# Patient Record
Sex: FEMALE | Race: BLACK OR AFRICAN AMERICAN | Employment: FULL TIME | URBAN - METROPOLITAN AREA
[De-identification: names, ages, dates, MRNs, and addresses within clinical notes are randomized per-mention and may not be internally consistent; named-entity substitution may affect disease eponyms.]

---

## 2020-10-25 ENCOUNTER — HOSPITAL ENCOUNTER (EMERGENCY)
Facility: CLINIC | Age: 45
Discharge: HOME | End: 2020-10-25
Attending: EMERGENCY MEDICINE | Admitting: EMERGENCY MEDICINE
Payer: COMMERCIAL

## 2020-10-25 ENCOUNTER — APPOINTMENT (OUTPATIENT)
Dept: GENERAL RADIOLOGY | Facility: CLINIC | Age: 45
End: 2020-10-25
Attending: EMERGENCY MEDICINE
Payer: COMMERCIAL

## 2020-10-25 VITALS
WEIGHT: 190 LBS | RESPIRATION RATE: 16 BRPM | TEMPERATURE: 98 F | SYSTOLIC BLOOD PRESSURE: 114 MMHG | BODY MASS INDEX: 28.79 KG/M2 | HEIGHT: 68 IN | HEART RATE: 79 BPM | OXYGEN SATURATION: 99 % | DIASTOLIC BLOOD PRESSURE: 78 MMHG

## 2020-10-25 DIAGNOSIS — R05.9 COUGH: ICD-10-CM

## 2020-10-25 DIAGNOSIS — U07.1 INFECTION DUE TO 2019 NOVEL CORONAVIRUS: ICD-10-CM

## 2020-10-25 LAB
ALBUMIN SERPL-MCNC: 3.2 G/DL (ref 3.4–5)
ALP SERPL-CCNC: 73 U/L (ref 40–150)
ALT SERPL W P-5'-P-CCNC: 31 U/L (ref 0–50)
ANION GAP SERPL CALCULATED.3IONS-SCNC: 4 MMOL/L (ref 3–14)
AST SERPL W P-5'-P-CCNC: 46 U/L (ref 0–45)
BASOPHILS # BLD AUTO: 0 10E9/L (ref 0–0.2)
BASOPHILS NFR BLD AUTO: 0.3 %
BILIRUB SERPL-MCNC: 0.2 MG/DL (ref 0.2–1.3)
BUN SERPL-MCNC: 8 MG/DL (ref 7–30)
CALCIUM SERPL-MCNC: 8.3 MG/DL (ref 8.5–10.1)
CHLORIDE SERPL-SCNC: 104 MMOL/L (ref 94–109)
CO2 SERPL-SCNC: 29 MMOL/L (ref 20–32)
CREAT SERPL-MCNC: 0.61 MG/DL (ref 0.52–1.04)
DIFFERENTIAL METHOD BLD: ABNORMAL
EOSINOPHIL # BLD AUTO: 0 10E9/L (ref 0–0.7)
EOSINOPHIL NFR BLD AUTO: 0.3 %
ERYTHROCYTE [DISTWIDTH] IN BLOOD BY AUTOMATED COUNT: 13.2 % (ref 10–15)
GFR SERPL CREATININE-BSD FRML MDRD: >90 ML/MIN/{1.73_M2}
GLUCOSE SERPL-MCNC: 98 MG/DL (ref 70–99)
HCT VFR BLD AUTO: 42.1 % (ref 35–47)
HGB BLD-MCNC: 13.8 G/DL (ref 11.7–15.7)
IMM GRANULOCYTES # BLD: 0 10E9/L (ref 0–0.4)
IMM GRANULOCYTES NFR BLD: 0 %
INTERPRETATION ECG - MUSE: NORMAL
LYMPHOCYTES # BLD AUTO: 1.6 10E9/L (ref 0.8–5.3)
LYMPHOCYTES NFR BLD AUTO: 48.6 %
MCH RBC QN AUTO: 30.1 PG (ref 26.5–33)
MCHC RBC AUTO-ENTMCNC: 32.8 G/DL (ref 31.5–36.5)
MCV RBC AUTO: 92 FL (ref 78–100)
MONOCYTES # BLD AUTO: 0.3 10E9/L (ref 0–1.3)
MONOCYTES NFR BLD AUTO: 8 %
NEUTROPHILS # BLD AUTO: 1.4 10E9/L (ref 1.6–8.3)
NEUTROPHILS NFR BLD AUTO: 42.8 %
NRBC # BLD AUTO: 0 10*3/UL
NRBC BLD AUTO-RTO: 0 /100
PLATELET # BLD AUTO: 134 10E9/L (ref 150–450)
POTASSIUM SERPL-SCNC: 3.6 MMOL/L (ref 3.4–5.3)
PROT SERPL-MCNC: 7.5 G/DL (ref 6.8–8.8)
RBC # BLD AUTO: 4.58 10E12/L (ref 3.8–5.2)
SODIUM SERPL-SCNC: 137 MMOL/L (ref 133–144)
TROPONIN I SERPL-MCNC: <0.015 UG/L (ref 0–0.04)
WBC # BLD AUTO: 3.3 10E9/L (ref 4–11)

## 2020-10-25 PROCEDURE — 93005 ELECTROCARDIOGRAM TRACING: CPT

## 2020-10-25 PROCEDURE — 99285 EMERGENCY DEPT VISIT HI MDM: CPT | Mod: 25

## 2020-10-25 PROCEDURE — 80053 COMPREHEN METABOLIC PANEL: CPT | Performed by: EMERGENCY MEDICINE

## 2020-10-25 PROCEDURE — 85025 COMPLETE CBC W/AUTO DIFF WBC: CPT | Performed by: EMERGENCY MEDICINE

## 2020-10-25 PROCEDURE — 84484 ASSAY OF TROPONIN QUANT: CPT | Performed by: EMERGENCY MEDICINE

## 2020-10-25 PROCEDURE — 71045 X-RAY EXAM CHEST 1 VIEW: CPT

## 2020-10-25 ASSESSMENT — ENCOUNTER SYMPTOMS
SHORTNESS OF BREATH: 1
COUGH: 1
CHEST TIGHTNESS: 1

## 2020-10-25 ASSESSMENT — MIFFLIN-ST. JEOR: SCORE: 1570.33

## 2020-10-25 NOTE — ED PROVIDER NOTES
"  History     Chief Complaint:  Suspected Covid and Cough    HPI   Anabel Moon is a 44 year old female who presents with cough and chest tightness/pressure present for a few days. She endorses slight shortness of breath and cough. Has a tightness on her chest. She denies any sharp pain, leg pain, or leg edema.The patient tested positive for Covid 10/18. She has been managing with OTC medications. No hx of DVT/PE. No hx of recent long travel or surgeries.    Allergies:  NKDA     Medications:    The patient is not currently taking any prescribed medications.    Past Medical History:    Covid-19    Past Surgical History:    The patient does not have any pertinent past surgical history.     Family History:    History reviewed. No pertinent family history.     Social History:  Unknown.    Review of Systems   Respiratory: Positive for cough, chest tightness and shortness of breath.    Cardiovascular: Negative for chest pain and leg swelling.   10 point review of systems was obtained and negative other than mentioned above.    Physical Exam     Patient Vitals for the past 24 hrs:   BP Temp Temp src Pulse Resp SpO2 Height Weight   10/25/20 1027 114/78 98  F (36.7  C) Oral 79 16 99 % 1.727 m (5' 8\") 86.2 kg (190 lb)       Physical Exam    General: Sitting up in bed  Eyes:  The pupils are equal and round    Conjunctivae and sclerae are normal  ENT:    Wearing a mask  Neck:  Normal range of motion  CV:  Regular rate, regular rhythm     Skin warm and well perfused   Resp:  Non labored breathing on room air    No tachypnea    No cough heard    Lungs clear bilaterally  GI:  Abdomen is soft, there is no rigidity    No distension    No rebound tenderness     No abdominal tenderness  MS:  No leg swelling  Skin:  No rash or acute skin lesions noted  Neuro:   Awake, alert.      Speech is normal and fluent.    Face is symmetric.     Moves all extremities equally  Psych: Normal affect.  Appropriate interactions.    Emergency Department " Course   ECG (10:58:50):  Rate 75 bpm. PA interval 148. QRS duration 90. QT/QTc 378/422. P-R-T axes 61 63 9. Normal sinus rhythm. Nonspecific T wave abnormality. Abnormal ECG. Interpreted at 1100 by Laverne Fong MD.    Imaging:  Radiology findings were communicated with the patient who voiced understanding of the findings.    XR Chest Port 1 View  IMPRESSION: No acute disease.  As read by Radiology.    Laboratory:  Laboratory findings were communicated with the patient who voiced understanding of the findings.    CMP: Calcium 8.3(L), Albumin 3.2(L), AST 46(H), o/w WNL (Creatinine 0.61)    CBC: WBC 3.3(L), HGB 13.8, (L)    Troponin I 1111: <0.015     Emergency Department Course:  Past medical records, nursing notes, and vitals reviewed.  1028: I performed an exam of the patient and obtained history, as documented above.     EKG was obtained and reviewed in the emergency department.    IV inserted and blood drawn.    The patient was sent for a XR while in the emergency department, results above.     1209: I rechecked the patient. I reviewed the results with the Patient and answered all related questions prior to discharge.     Findings and plan explained to the Patient. Patient discharged home with instructions regarding supportive care, medications, and reasons to return. The importance of close follow-up was reviewed.   Impression & Plan     Medical Decision Making:  Anabel Moon is a 44 year old female who presents for evaluation of cough and chest tightness. Has known covid. Given that the patient is otherwise hemodynamically stable without significant hypoxia, I do not believe that the patient requires admission here today. They are at risk for pneumonia but no signs of this are detected on today's visit. Doubt PE with no hypoxia, no tachycardia, no pleuritic pain. No evidence of myocarditis and doubt ACS.  There is no signs of serious bacterial infection such as bacteremia, meningitis,  UTI/pyelonephritis, strep pharyngitis, etc. Thinks she is on day 9-10 days of symptoms and should hopefully start improving soon.    Covid-19  Anabel Moon was evaluated during a global COVID-19 pandemic, which necessitated consideration that the patient might be at risk for infection with the SARS-CoV-2 virus that causes COVID-19.   Applicable protocols for evaluation were followed during the patient's care.   COVID-19 was considered as part of the patient's evaluation. The plan for testing is:  a positive test was obtained seven days ago and reviewed & considered today.    Diagnosis:    ICD-10-CM   1. Cough  R05   2. Infection due to 2019 novel coronavirus  U07.1       Disposition:  Discharged to home.    Mahendra Washington  10/25/2020   [facility] EMERGENCY DEPT    Scribe Disclosure:  Mahendra RAZA, am serving as a scribe at 10:24 AM on 10/25/2020 to document services personally performed by Laverne Fong MD based on my observations and the provider's statements to me.          Laverne Fong MD  10/25/20 5140

## 2020-10-25 NOTE — ED AVS SNAPSHOT
[facility] Emergency Dept  [address] [address]  Phone: [phone]  Fax: [phone]                                    Anabel Moon   MRN: [id number]    Department: [facility] Emergency Dept   Date of Visit: 10/25/2020           After Visit Summary Signature Page    I have received my discharge instructions, and my questions have been answered. I have discussed any challenges I see with this plan with the nurse or doctor.    ..........................................................................................................................................  Patient/Patient Representative Signature      ..........................................................................................................................................  Patient Representative Print Name and Relationship to Patient    ..................................................               ................................................  Date                                   Time    ..........................................................................................................................................  Reviewed by Signature/Title    ...................................................              ..............................................  Date                                               Time          22EPIC Rev 08/18

## 2021-01-07 ENCOUNTER — OFFICE VISIT (OUTPATIENT)
Dept: URGENT CARE | Facility: URGENT CARE | Age: 46
End: 2021-01-07
Payer: COMMERCIAL

## 2021-01-07 VITALS
HEART RATE: 59 BPM | SYSTOLIC BLOOD PRESSURE: 128 MMHG | HEIGHT: 67 IN | BODY MASS INDEX: 29.82 KG/M2 | TEMPERATURE: 98 F | DIASTOLIC BLOOD PRESSURE: 79 MMHG | OXYGEN SATURATION: 100 % | WEIGHT: 190 LBS | RESPIRATION RATE: 19 BRPM

## 2021-01-07 DIAGNOSIS — K21.9 GASTROESOPHAGEAL REFLUX DISEASE WITHOUT ESOPHAGITIS: Primary | ICD-10-CM

## 2021-01-07 PROCEDURE — 99204 OFFICE O/P NEW MOD 45 MIN: CPT | Performed by: PHYSICIAN ASSISTANT

## 2021-01-07 ASSESSMENT — MIFFLIN-ST. JEOR: SCORE: 1549.46

## 2021-01-07 NOTE — PROGRESS NOTES
"  1. Gastroesophageal reflux disease without esophagitis    - omeprazole (PRILOSEC) 20 MG DR capsule; Take 1 capsule (20 mg) by mouth daily  Dispense: 30 capsule; Refill: 1      She was educated on lifestyle and food changes to help decrease her symptoms. Trial of omeprazole as well. She will follow up in a  clinic to also establish care.     Subjective     Anabel Moon is a 45 year old who presents to clinic today for the following health issues     HPI     2 days ago in [place] with blood work for her burning sensation to stomach. CBC normal. BMP normal.  No blood or blackness in stool. No medication given. Worsening symptoms with acid reflux and burning sensation. No nausea or vomiting.  Does eat food that can be irritating including spicy foods.               Review of Systems         Objective    /79   Pulse 59   Temp 98  F (36.7  C) (Temporal)   Resp 19   Ht 1.702 m (5' 7\")   Wt 86.2 kg (190 lb)   SpO2 100%   BMI 29.76 kg/m    Body mass index is 29.76 kg/m .  Physical Exam  Constitutional:       Appearance: Normal appearance.   Abdominal:      General: Abdomen is flat. Bowel sounds are normal.      Tenderness: There is abdominal tenderness in the epigastric area. There is no guarding or rebound.       Neurological:      Mental Status: She is alert.                        "

## 2021-01-08 ENCOUNTER — OFFICE VISIT (OUTPATIENT)
Dept: FAMILY MEDICINE | Facility: CLINIC | Age: 46
End: 2021-01-08
Payer: COMMERCIAL

## 2021-01-08 ENCOUNTER — HOSPITAL ENCOUNTER (OUTPATIENT)
Dept: CT IMAGING | Facility: CLINIC | Age: 46
Discharge: HOME | End: 2021-01-08
Attending: NURSE PRACTITIONER | Admitting: NURSE PRACTITIONER
Payer: COMMERCIAL

## 2021-01-08 VITALS
WEIGHT: 185 LBS | BODY MASS INDEX: 29.03 KG/M2 | HEIGHT: 67 IN | DIASTOLIC BLOOD PRESSURE: 71 MMHG | OXYGEN SATURATION: 100 % | HEART RATE: 71 BPM | TEMPERATURE: 97.5 F | SYSTOLIC BLOOD PRESSURE: 108 MMHG

## 2021-01-08 DIAGNOSIS — Z86.16 PERSONAL HISTORY OF COVID-19: ICD-10-CM

## 2021-01-08 DIAGNOSIS — F40.9 FEAR: ICD-10-CM

## 2021-01-08 DIAGNOSIS — R06.02 SOB (SHORTNESS OF BREATH): ICD-10-CM

## 2021-01-08 DIAGNOSIS — F41.9 ANXIETY: ICD-10-CM

## 2021-01-08 DIAGNOSIS — R00.2 POUNDING HEARTBEAT: Primary | ICD-10-CM

## 2021-01-08 DIAGNOSIS — R00.2 POUNDING HEARTBEAT: ICD-10-CM

## 2021-01-08 DIAGNOSIS — R30.0 DYSURIA: ICD-10-CM

## 2021-01-08 LAB
ALBUMIN UR-MCNC: NEGATIVE MG/DL
ANION GAP SERPL CALCULATED.3IONS-SCNC: 4 MMOL/L (ref 3–14)
APPEARANCE UR: CLEAR
BILIRUB UR QL STRIP: NEGATIVE
BUN SERPL-MCNC: 6 MG/DL (ref 7–30)
CALCIUM SERPL-MCNC: 8.9 MG/DL (ref 8.5–10.1)
CHLORIDE SERPL-SCNC: 106 MMOL/L (ref 94–109)
CO2 SERPL-SCNC: 29 MMOL/L (ref 20–32)
COLOR UR AUTO: YELLOW
CREAT SERPL-MCNC: 0.63 MG/DL (ref 0.52–1.04)
ERYTHROCYTE [DISTWIDTH] IN BLOOD BY AUTOMATED COUNT: 14 % (ref 10–15)
GFR SERPL CREATININE-BSD FRML MDRD: >90 ML/MIN/{1.73_M2}
GLUCOSE SERPL-MCNC: 94 MG/DL (ref 70–99)
GLUCOSE UR STRIP-MCNC: NEGATIVE MG/DL
HCT VFR BLD AUTO: 39.3 % (ref 35–47)
HGB BLD-MCNC: 12.7 G/DL (ref 11.7–15.7)
HGB UR QL STRIP: NEGATIVE
KETONES UR STRIP-MCNC: NEGATIVE MG/DL
LEUKOCYTE ESTERASE UR QL STRIP: NEGATIVE
MCH RBC QN AUTO: 30.2 PG (ref 26.5–33)
MCHC RBC AUTO-ENTMCNC: 32.3 G/DL (ref 31.5–36.5)
MCV RBC AUTO: 93 FL (ref 78–100)
NITRATE UR QL: NEGATIVE
PH UR STRIP: 7 PH (ref 5–7)
PLATELET # BLD AUTO: 250 10E9/L (ref 150–450)
POTASSIUM SERPL-SCNC: 4.2 MMOL/L (ref 3.4–5.3)
RBC # BLD AUTO: 4.21 10E12/L (ref 3.8–5.2)
RBC #/AREA URNS AUTO: NORMAL /HPF
SODIUM SERPL-SCNC: 139 MMOL/L (ref 133–144)
SOURCE: NORMAL
SP GR UR STRIP: 1.01 (ref 1–1.03)
TSH SERPL DL<=0.005 MIU/L-ACNC: 1.81 MU/L (ref 0.4–4)
UROBILINOGEN UR STRIP-ACNC: 0.2 EU/DL (ref 0.2–1)
WBC # BLD AUTO: 6.9 10E9/L (ref 4–11)
WBC #/AREA URNS AUTO: NORMAL /HPF

## 2021-01-08 PROCEDURE — 71275 CT ANGIOGRAPHY CHEST: CPT

## 2021-01-08 PROCEDURE — 250N000011 HC RX IP 250 OP 636: Performed by: NURSE PRACTITIONER

## 2021-01-08 PROCEDURE — 99204 OFFICE O/P NEW MOD 45 MIN: CPT | Performed by: NURSE PRACTITIONER

## 2021-01-08 PROCEDURE — 81001 URINALYSIS AUTO W/SCOPE: CPT | Performed by: NURSE PRACTITIONER

## 2021-01-08 PROCEDURE — 36415 COLL VENOUS BLD VENIPUNCTURE: CPT | Performed by: NURSE PRACTITIONER

## 2021-01-08 PROCEDURE — 93000 ELECTROCARDIOGRAM COMPLETE: CPT | Performed by: NURSE PRACTITIONER

## 2021-01-08 PROCEDURE — 80048 BASIC METABOLIC PNL TOTAL CA: CPT | Performed by: NURSE PRACTITIONER

## 2021-01-08 PROCEDURE — 84443 ASSAY THYROID STIM HORMONE: CPT | Performed by: NURSE PRACTITIONER

## 2021-01-08 PROCEDURE — 85027 COMPLETE CBC AUTOMATED: CPT | Performed by: NURSE PRACTITIONER

## 2021-01-08 PROCEDURE — 250N000009 HC RX 250: Performed by: NURSE PRACTITIONER

## 2021-01-08 RX ORDER — LORAZEPAM 0.5 MG/1
0.5 TABLET ORAL EVERY 6 HOURS PRN
Qty: 5 TABLET | Refills: 0 | Status: SHIPPED | OUTPATIENT
Start: 2021-01-08 | End: 2021-01-11

## 2021-01-08 RX ORDER — IOPAMIDOL 755 MG/ML
69 INJECTION, SOLUTION INTRAVASCULAR ONCE
Status: COMPLETED | OUTPATIENT
Start: 2021-01-08 | End: 2021-01-08

## 2021-01-08 RX ADMIN — SODIUM CHLORIDE 93 ML: 9 INJECTION, SOLUTION INTRAVENOUS at 15:46

## 2021-01-08 RX ADMIN — IOPAMIDOL 69 ML: 755 INJECTION, SOLUTION INTRAVENOUS at 15:46

## 2021-01-08 SDOH — HEALTH STABILITY: MENTAL HEALTH: HOW OFTEN DO YOU HAVE 6 OR MORE DRINKS ON ONE OCCASION?: NEVER

## 2021-01-08 SDOH — HEALTH STABILITY: MENTAL HEALTH: HOW OFTEN DO YOU HAVE A DRINK CONTAINING ALCOHOL?: NEVER

## 2021-01-08 SDOH — HEALTH STABILITY: MENTAL HEALTH: HOW MANY STANDARD DRINKS CONTAINING ALCOHOL DO YOU HAVE ON A TYPICAL DAY?: NOT ASKED

## 2021-01-08 ASSESSMENT — ANXIETY QUESTIONNAIRES
1. FEELING NERVOUS, ANXIOUS, OR ON EDGE: NEARLY EVERY DAY
6. BECOMING EASILY ANNOYED OR IRRITABLE: NEARLY EVERY DAY
5. BEING SO RESTLESS THAT IT IS HARD TO SIT STILL: NEARLY EVERY DAY
7. FEELING AFRAID AS IF SOMETHING AWFUL MIGHT HAPPEN: NEARLY EVERY DAY
IF YOU CHECKED OFF ANY PROBLEMS ON THIS QUESTIONNAIRE, HOW DIFFICULT HAVE THESE PROBLEMS MADE IT FOR YOU TO DO YOUR WORK, TAKE CARE OF THINGS AT HOME, OR GET ALONG WITH OTHER PEOPLE: SOMEWHAT DIFFICULT
GAD7 TOTAL SCORE: 21
3. WORRYING TOO MUCH ABOUT DIFFERENT THINGS: NEARLY EVERY DAY
2. NOT BEING ABLE TO STOP OR CONTROL WORRYING: NEARLY EVERY DAY

## 2021-01-08 ASSESSMENT — MIFFLIN-ST. JEOR: SCORE: 1526.78

## 2021-01-08 ASSESSMENT — PATIENT HEALTH QUESTIONNAIRE - PHQ9: 5. POOR APPETITE OR OVEREATING: NEARLY EVERY DAY

## 2021-01-08 NOTE — PATIENT INSTRUCTIONS
We are rechecking basic labs and EKG today.    You will go to the hospital for a CT scan.    We will talk after I see results

## 2021-01-08 NOTE — PROGRESS NOTES
Assessment & Plan     Anabel presents with vague anxiety/fear and chest symptoms for a couple weeks. She has no previous history of anxiety. She Had covid a couple months ago. She just flew to [place] and returned on 1/5. En route to [place] she was evaluated in [place] for her symptoms and CXR showed covid pneumonia.   With these vague chest symptoms, recent international travel, recent covid, I have concern for PE. Will eval with CT.  Labs are also completed for further eval of pounding heart and breathing changes.  If CT is positive, will get her admitted. If CT is negative then will consider just a few ativan until she can return to [facility] care with a new PCP. This may just be r/t her covid pneumonia so may need pulmonology referral in the future.   She knows to go to ED with any worsening symptoms     Called the patient to follow-up on CT and lab results. Explained her results came back normal and there was no evidence of PE. TSH normal. Her symptoms are likely related to anxiety and situational stress. Placed referral for mental health therapy.   -Sent rx for ativan 0.5mg. Advised patient to only take 1/2-1 tablet while experiencing panic attacks (SOB, pounding heart, anxiety). Only prescribed for short-term relief.  -Establish care with PCP. Pt referred to [facility] for ongoing anxiety management.      Pounding heartbeat  - CT Chest Pulmonary Embolism w Contrast; Future  - EKG 12-lead complete w/read - Clinics  - TSH with free T4 reflex  - Basic metabolic panel  (Ca, Cl, CO2, Creat, Gluc, K, Na, BUN)  - CBC with platelets    Fear    Personal history of covid-19  - CT Chest Pulmonary Embolism w Contrast; Future  - TSH with free T4 reflex  - Basic metabolic panel  (Ca, Cl, CO2, Creat, Gluc, K, Na, BUN)  - CBC with platelets    SOB (shortness of breath)  - CT Chest Pulmonary Embolism w Contrast; Future  - TSH with free T4 reflex  - Basic metabolic panel  (Ca, Cl, CO2, Creat, Gluc, K, Na, BUN)  - CBC with  platelets    Dysuria  - UA with Microscopic reflex to Culture        Review of external notes as documented above   Review of the result(s) of each unique test - labs, CXR    65 minutes spent on the date of the encounter doing chart review, history and exam, documentation and further activities as noted above      Patient Instructions   We are rechecking basic labs and EKG today.    You will go to the hospital for a CT scan.    We will talk after I see results           No follow-ups on file.    MARY Davis [facility] AUSTIN Little is a 45 year old who presents to clinic today for the following health issues     HPI       ED/UC Followup:    Facility:  [facility] Emergency Dept  Date of visit: 01/05/2021  Reason for visit: Myalgia, Viral syndrome  Current Status: same      Just moved from [place] 4 months ago   The last couple weeks has had a gradual worsening of anxiety symptoms   Feels like heart is coming out of her chest  Feels like she is anxious, fear   Also getting occasional Shakiness, SOB. Feels like she has to take a deep breath at times  Minimal dysuria, frequency. Had a UTI in the past   Has HA and dizziness   Known hypothyroidism   Was on synthroid but stopped awhile back   Has no significant history of anxiety   Has monthly normal period. No significant PMS   Was just seen in the ED for these same symptoms     Works from home as    Lost a little bit of weight   Decreased appetite   Went to [place] 12/20-1/5.   Mild chronic on and off ankle swelling  Feels like she has full body tense feeling   No specific calf pain   No personal or family history of blood clot    Had covid 10/18. Symptoms lasted over 1 month   ED visit 3 days ago. Had K 3.4, hgb 11.9, normal plt.    CXR Findings:     Subtle diffuse bilateral subpleural infiltrates concerning for Covid pneumonia. Midline trachea. Cardiac  size within normal limits. No  "pneumothorax, pneumomediastinum or pleural effusion.     IMPRESSION:  Question of subtle diffuse bilateral infiltrates, Covid, pneumonia should be clinically excluded.    No past medical history on file.  No family history on file.  No past surgical history on file.  Social History     Tobacco Use     Smoking status: Never Smoker     Smokeless tobacco: Never Used   Substance Use Topics     Alcohol use: Never     Frequency: Never     Binge frequency: Never     Current Outpatient Medications   Medication Sig Dispense Refill     LORazepam (ATIVAN) 0.5 MG tablet Take 1 tablet (0.5 mg) by mouth every 6 hours as needed for anxiety 5 tablet 0     omeprazole (PRILOSEC) 20 MG DR capsule Take 1 capsule (20 mg) by mouth daily 30 capsule 1     Allergies   Allergen Reactions     No Known Allergies        Reviewed and updated as needed this visit by clinical staff and provider     Review of Systems   Detailed as above       Objective    /71 (BP Location: Left arm, Patient Position: Sitting, Cuff Size: Adult Large)   Pulse 71   Temp 97.5  F (36.4  C) (Temporal)   Ht 1.702 m (5' 7\")   Wt 83.9 kg (185 lb)   LMP 12/26/2020 (Approximate)   SpO2 100%   BMI 28.98 kg/m    Body mass index is 28.98 kg/m .  Physical Exam  Constitutional:       Appearance: Normal appearance.   HENT:      Head: Normocephalic.   Eyes:      Conjunctiva/sclera: Conjunctivae normal.   Cardiovascular:      Rate and Rhythm: Normal rate and regular rhythm.      Heart sounds: Normal heart sounds. No murmur.   Pulmonary:      Effort: Pulmonary effort is normal. No respiratory distress.      Breath sounds: Normal breath sounds.   Musculoskeletal: Normal range of motion.      Right lower leg: No edema.      Left lower leg: No edema.   Skin:     General: Skin is warm and dry.   Neurological:      General: No focal deficit present.      Mental Status: She is alert.   Psychiatric:         Mood and Affect: Mood normal.            Results for orders placed or " performed in visit on 01/08/21 (from the past 24 hour(s))   TSH with free T4 reflex   Result Value Ref Range    TSH 1.81 0.40 - 4.00 mU/L   Basic metabolic panel  (Ca, Cl, CO2, Creat, Gluc, K, Na, BUN)   Result Value Ref Range    Sodium 139 133 - 144 mmol/L    Potassium 4.2 3.4 - 5.3 mmol/L    Chloride 106 94 - 109 mmol/L    Carbon Dioxide 29 20 - 32 mmol/L    Anion Gap 4 3 - 14 mmol/L    Glucose 94 70 - 99 mg/dL    Urea Nitrogen 6 (L) 7 - 30 mg/dL    Creatinine 0.63 0.52 - 1.04 mg/dL    GFR Estimate >90 >60 mL/min/[1.73_m2]    GFR Estimate If Black >90 >60 mL/min/[1.73_m2]    Calcium 8.9 8.5 - 10.1 mg/dL   CBC with platelets   Result Value Ref Range    WBC 6.9 4.0 - 11.0 10e9/L    RBC Count 4.21 3.8 - 5.2 10e12/L    Hemoglobin 12.7 11.7 - 15.7 g/dL    Hematocrit 39.3 35.0 - 47.0 %    MCV 93 78 - 100 fl    MCH 30.2 26.5 - 33.0 pg    MCHC 32.3 31.5 - 36.5 g/dL    RDW 14.0 10.0 - 15.0 %    Platelet Count 250 150 - 450 10e9/L   UA with Microscopic reflex to Culture    Specimen: Midstream Urine   Result Value Ref Range    Color Urine Yellow     Appearance Urine Clear     Glucose Urine Negative NEG^Negative mg/dL    Bilirubin Urine Negative NEG^Negative    Ketones Urine Negative NEG^Negative mg/dL    Specific Gravity Urine 1.010 1.003 - 1.035    pH Urine 7.0 5.0 - 7.0 pH    Protein Albumin Urine Negative NEG^Negative mg/dL    Urobilinogen Urine 0.2 0.2 - 1.0 EU/dL    Nitrite Urine Negative NEG^Negative    Blood Urine Negative NEG^Negative    Leukocyte Esterase Urine Negative NEG^Negative    Source Midstream Urine     WBC Urine 0 - 5 OTO5^0 - 5 /HPF    RBC Urine O - 2 OTO2^O - 2 /HPF

## 2021-01-09 ASSESSMENT — ANXIETY QUESTIONNAIRES: GAD7 TOTAL SCORE: 21

## 2021-01-11 ENCOUNTER — ANCILLARY PROCEDURE (OUTPATIENT)
Dept: GENERAL RADIOLOGY | Facility: CLINIC | Age: 46
End: 2021-01-11
Attending: FAMILY MEDICINE
Payer: COMMERCIAL

## 2021-01-11 ENCOUNTER — MYC MEDICAL ADVICE (OUTPATIENT)
Dept: FAMILY MEDICINE | Facility: CLINIC | Age: 46
End: 2021-01-11

## 2021-01-11 ENCOUNTER — OFFICE VISIT (OUTPATIENT)
Dept: FAMILY MEDICINE | Facility: CLINIC | Age: 46
End: 2021-01-11
Payer: COMMERCIAL

## 2021-01-11 ENCOUNTER — NURSE TRIAGE (OUTPATIENT)
Dept: NURSING | Facility: CLINIC | Age: 46
End: 2021-01-11

## 2021-01-11 VITALS
HEIGHT: 67 IN | BODY MASS INDEX: 27.94 KG/M2 | RESPIRATION RATE: 16 BRPM | DIASTOLIC BLOOD PRESSURE: 84 MMHG | WEIGHT: 178 LBS | HEART RATE: 70 BPM | SYSTOLIC BLOOD PRESSURE: 130 MMHG | TEMPERATURE: 99.2 F | OXYGEN SATURATION: 99 %

## 2021-01-11 DIAGNOSIS — Z12.4 SCREENING FOR CERVICAL CANCER: ICD-10-CM

## 2021-01-11 DIAGNOSIS — Z00.00 ROUTINE GENERAL MEDICAL EXAMINATION AT A HEALTH CARE FACILITY: Primary | ICD-10-CM

## 2021-01-11 DIAGNOSIS — F41.0 ANXIETY ATTACK: ICD-10-CM

## 2021-01-11 DIAGNOSIS — F32.1 CURRENT MODERATE EPISODE OF MAJOR DEPRESSIVE DISORDER WITHOUT PRIOR EPISODE (H): ICD-10-CM

## 2021-01-11 DIAGNOSIS — F41.9 ANXIETY: ICD-10-CM

## 2021-01-11 PROCEDURE — G0145 SCR C/V CYTO,THINLAYER,RESCR: HCPCS | Performed by: FAMILY MEDICINE

## 2021-01-11 PROCEDURE — 99396 PREV VISIT EST AGE 40-64: CPT | Performed by: FAMILY MEDICINE

## 2021-01-11 PROCEDURE — 71046 X-RAY EXAM CHEST 2 VIEWS: CPT | Mod: FY | Performed by: RADIOLOGY

## 2021-01-11 PROCEDURE — 99214 OFFICE O/P EST MOD 30 MIN: CPT | Mod: 25 | Performed by: FAMILY MEDICINE

## 2021-01-11 PROCEDURE — 96127 BRIEF EMOTIONAL/BEHAV ASSMT: CPT | Performed by: FAMILY MEDICINE

## 2021-01-11 PROCEDURE — 87624 HPV HI-RISK TYP POOLED RSLT: CPT | Performed by: FAMILY MEDICINE

## 2021-01-11 RX ORDER — LORAZEPAM 0.5 MG/1
0.5 TABLET ORAL DAILY PRN
Qty: 10 TABLET | Refills: 0 | Status: SHIPPED | OUTPATIENT
Start: 2021-01-11 | End: 2021-02-01

## 2021-01-11 RX ORDER — LORAZEPAM 0.5 MG/1
0.5 TABLET ORAL EVERY 6 HOURS PRN
Qty: 5 TABLET | Refills: 0 | Status: CANCELLED | OUTPATIENT
Start: 2021-01-11

## 2021-01-11 RX ORDER — TRAZODONE HYDROCHLORIDE 50 MG/1
50 TABLET, FILM COATED ORAL AT BEDTIME
Qty: 30 TABLET | Refills: 3 | Status: SHIPPED | OUTPATIENT
Start: 2021-01-11 | End: 2021-01-19

## 2021-01-11 ASSESSMENT — MIFFLIN-ST. JEOR: SCORE: 1487.64

## 2021-01-11 ASSESSMENT — PATIENT HEALTH QUESTIONNAIRE - PHQ9: SUM OF ALL RESPONSES TO PHQ QUESTIONS 1-9: 14

## 2021-01-11 NOTE — PROGRESS NOTES
"   SUBJECTIVE:   CC: Anabel Moon is an 45 year old woman who presents for preventive health visit.       Patient has been advised of split billing requirements and indicates understanding: Yes  Healthy Habits:     Getting at least 3 servings of Calcium per day:  NO    Bi-annual eye exam:  NO    Dental care twice a year:  NO    Sleep apnea or symptoms of sleep apnea:  Daytime drowsiness and Excessive snoring    Diet:  Regular (no restrictions)    Frequency of exercise:  1 day/week    Duration of exercise:  15-30 minutes    Taking medications regularly:  Yes    Medication side effects:  Not applicable    PHQ-2 Total Score: 2    Additional concerns today:  No      Anxiety induced stress back in college days  Worse anxiety attacks for past 1 week,Unable to sleep  Patient would like to discuss trazodone 50MG possibly getting a prescription   Dad's cousin, paternal aunty - in her 40's  suddenly and since then she has been having anxiety attack and sleep is very poor    She had COVID- mild symptoms- recovered OCT 2020. Now worried if having COVID again.  Anxiety attack in plane- on her way back from 2 weeks vacation in [place] with her sister.  Plane was diverted to [place]- she arrived on 2021- work up was negative for COVID PCR- but Chest xray  Was abnormal       Had follow up appointment on 2021- CT scan- negative for PE  Blood work negative  No shortness of breath, no coughing, no hiccough, intact smell and taste but wakes up having pounding heart beat. Anxiety, \"something is wrong\"  Feeling afraid something worse will happen. Sister who traveled with her is ok. They had uninterrupted time/ vacation in [facility] .    Today's PHQ-2 Score:   PHQ-2 (  [employer]) 2021   Q1: Little interest or pleasure in doing things 1   Q2: Feeling down, depressed or hopeless 1   PHQ-2 Score 2   Q1: Little interest or pleasure in doing things Several days   Q2: Feeling down, depressed or hopeless Several days   PHQ-2 Score " 2       Abuse: Current or Past (Physical, Sexual or Emotional) - No  Do you feel safe in your environment? Yes        Social History     Tobacco Use     Smoking status: Never Smoker     Smokeless tobacco: Never Used   Substance Use Topics     Alcohol use: Never     Frequency: Never     Binge frequency: Never     If you drink alcohol do you typically have >3 drinks per day or >7 drinks per week? No    Alcohol Use 1/11/2021   Prescreen: >3 drinks/day or >7 drinks/week? No   Prescreen: >3 drinks/day or >7 drinks/week? -   No flowsheet data found.    Reviewed orders with patient.  Reviewed health maintenance and updated orders accordingly - Yes  BP Readings from Last 3 Encounters:   01/11/21 130/84   01/08/21 108/71   01/07/21 128/79    Wt Readings from Last 3 Encounters:   01/11/21 80.7 kg (178 lb)   01/08/21 83.9 kg (185 lb)   01/07/21 86.2 kg (190 lb)            Mammogram not appropriate for this patient based on age.    Pertinent mammograms are reviewed under the imaging tab.  History of abnormal Pap smear: NO - age 30-65 PAP every 5 years with negative HPV co-testing recommended     Reviewed and updated as needed this visit by clinical staff  Tobacco  Allergies  Meds  Problems  Med Hx  Surg Hx  Fam Hx  Soc Hx          Reviewed and updated as needed this visit by Provider  Tobacco  Allergies  Meds  Problems  Med Hx  Surg Hx  Fam Hx             Review of Systems  CONSTITUTIONAL: NEGATIVE for fever, chills, change in weight  INTEGUMENTARU/SKIN: NEGATIVE for worrisome rashes, moles or lesions  EYES: NEGATIVE for vision changes or irritation  ENT: NEGATIVE for ear, mouth and throat problems  RESP: NEGATIVE for significant cough or SOB  BREAST: NEGATIVE for masses, tenderness or discharge  CV: NEGATIVE for chest pain, palpitations or peripheral edema  GI: NEGATIVE for nausea, abdominal pain, heartburn, or change in bowel habits  : NEGATIVE for unusual urinary or vaginal symptoms. Periods are  "regular.  MUSCULOSKELETAL: NEGATIVE for significant arthralgias or myalgia  NEURO: NEGATIVE for weakness, dizziness or paresthesias  PSYCHIATRIC: NEGATIVE for changes in mood or affect     OBJECTIVE:   /84   Pulse 70   Temp 99.2  F (37.3  C) (Tympanic)   Resp 16   Ht 1.69 m (5' 6.54\")   Wt 80.7 kg (178 lb)   LMP 12/26/2020 (Approximate)   SpO2 99%   BMI 28.27 kg/m    Physical Exam  GENERAL: healthy, alert and no distress  EYES: Eyes grossly normal to inspection, PERRL and conjunctivae and sclerae normal  HENT: ear canals and TM's normal, nose and mouth without ulcers or lesions  NECK: no adenopathy, no asymmetry, masses, or scars and thyroid normal to palpation  RESP: lungs clear to auscultation - no rales, rhonchi or wheezes  BREAST: normal without masses, tenderness or nipple discharge and no palpable axillary masses or adenopathy  CV: regular rate and rhythm, normal S1 S2, no S3 or S4, no murmur, click or rub, no peripheral edema and peripheral pulses strong  ABDOMEN: soft, nontender, no hepatosplenomegaly, no masses and bowel sounds normal   (female): normal female external genitalia, normal urethral meatus, vaginal mucosa pink, moist, well rugated, and normal cervix/adnexa/uterus without masses or discharge  MS: no gross musculoskeletal defects noted, no edema  SKIN: no suspicious lesions or rashes  NEURO: Normal strength and tone, mentation intact and speech normal  PSYCH: mentation appears normal, affect normal/bright  PHQ 1/11/2021   PHQ-9 Total Score 14   Q9: Thoughts of better off dead/self-harm past 2 weeks Not at all     AMANDA-7 SCORE 1/8/2021   Total Score 21       Diagnostic Test Results:  Labs reviewed in Epic  Results for orders placed or performed in visit on 01/11/21 (from the past 24 hour(s))   XR Chest 2 Views    Narrative    CHEST TWO VIEWS 1/11/2021 3:21 PM     HISTORY: Anxiety attack.    COMPARISON: October 25, 2020       Impression    IMPRESSION:  There are no acute infiltrates. " The cardiac silhouette is  not enlarged. Pulmonary vasculature is unremarkable.     YOLANDE UPTON MD       ASSESSMENT/PLAN:   Anabel 45 year old female was seen today for physical.    Diagnoses and all orders for this visit:    Routine general medical examination at a health care facility  Pap is sent.if NIL repeat ok in 5 yrs  Mammogram discussed and patient reports her actual date of brth is at least 6 yrs younger- so she is only in her late 30's.      Anxiety attack    Long discussion about mental health  Reviewing her recent emergency department visit and  visit and all the work up    -     XR Chest 2 Views- is clear- reassurance is given  [facility]/ recent CT scan reviewed  negative for PE  covid test in [place] negative for COVID- she can repeat another at home on own by requesting from   a [facility] home kit      We discussed limited use of ativan only for anxiety attack , as needed  And not use it as sleeping aide . No automatic refills.  -     LORazepam (ATIVAN) 0.5 MG tablet; # 10 given.Take 1 tablet (0.5 mg) by mouth daily as needed for anxiety    Current moderate episode of major depressive disorder without prior episode (H)  - advised to consider counseling- she will think about it and is looking into medications for now     In order for sleep to improve ,we discussed sleep hygiene as well  Ok to start trazodone and that will help with  Depression as well- that seem to have been triggering anxiety attacks      traZODone (DESYREL) 50 MG tablet; Take 1 tablet (50 mg) by mouth At Bedtime # 30 given.  Potential medication side effects were discussed with the patient; let me know if any occur.    Follow up in 4 weeks, earlier f concerns   Screening for cervical cancer  -     Pap imaged thin layer screen with HPV - recommended age 30 - 65 years (select HPV order below)  -     HPV High Risk Types DNA Cervical    Other orders  -     INFLUENZA VACCINE IM > 6 MONTHS VALENT IIV4 [[id number]]        Patient has been advised of  "split billing requirements and indicates understanding: Yes  COUNSELING:  Reviewed preventive health counseling, as reflected in patient instructions       Regular exercise       Healthy diet/nutrition       Vision screening       Hearing screening       Folic Acid    Estimated body mass index is 28.27 kg/m  as calculated from the following:    Height as of this encounter: 1.69 m (5' 6.54\").    Weight as of this encounter: 80.7 kg (178 lb).        She reports that she has never smoked. She has never used smokeless tobacco.      Counseling Resources:  ATP IV Guidelines  Pooled Cohorts Equation Calculator  Breast Cancer Risk Calculator  BRCA-Related Cancer Risk Assessment: FHS-7 Tool  FRAX Risk Assessment  ICSI Preventive Guidelines  Dietary Guidelines for Americans, 2010  USDA's MyPlate  ASA Prophylaxis  Lung CA Screening    Angelica Mendez MD  [facility]  "

## 2021-01-11 NOTE — TELEPHONE ENCOUNTER
Anabel was seen by Arnulfo Kidd CNP on 1/8/2021 for a pounding heartbeat and was prescribed Lorazepam 0.5mg Anabel is calling today requesting a new prescription be sent as she cannot find her medication.  Please prescribed and phone Anabel when this has been completed.    COVID 19 Nurse Triage Plan/Patient Instructions    Please be aware that novel coronavirus (COVID-19) may be circulating in the community. If you develop symptoms such as fever, cough, or SOB or if you have concerns about the presence of another infection including coronavirus (COVID-19), please contact your health care provider or visit [email or web address].     Disposition/Instructions    Home care recommended. Follow home care protocol based instructions.    Thank you for taking steps to prevent the spread of this virus.  o Limit your contact with others.  o Wear a simple mask to cover your cough.  o Wash your hands well and often.    Resources    [facility]: About COVID-19: [email or web address]    CDC: What to Do If You're Sick: www.cdc.gov/coronavirus/2019-ncov/about/steps-when-sick.html    CDC: Ending Home Isolation: www.cdc.gov/coronavirus/2019-ncov/hcp/disposition-in-home-patients.html     CDC: Caring for Someone: www.cdc.gov/coronavirus/2019-ncov/if-you-are-sick/care-for-someone.html     [facility]: Interim Guidance for Hospital Discharge to Home: www.health.[facility].[place].us/diseases/coronavirus/hcp/hospdischarge.pdf    [facility] clinical trials (COVID-19 research studies): clinicalaffairs.[facility]-clinical-trials     Below are the COVID-19 hotlines at the [place] Department of Health ([facility]). Interpreters are available.   o For health questions: Call 417-709-9280 or 1-784.403.6290 (7 a.m. to 7 p.m.)  o For questions about schools and childcare: Call 942-147-6653 or 1-704.302.5754 (7 a.m. to 7 p.m.)                     Reason for Disposition    Caller requesting a NON-URGENT new prescription or refill and triager unable to  "refill per unit policy    Additional Information    Negative: MORE THAN A DOUBLE DOSE of a prescription or over-the-counter (OTC) drug    Negative: [1] DOUBLE DOSE (an extra dose or lesser amount) of over-the-counter (OTC) drug AND [2] any symptoms (e.g., dizziness, nausea, pain, sleepiness)    Negative: [1] DOUBLE DOSE (an extra dose or lesser amount) of prescription drug AND [2] any symptoms (e.g., dizziness, nausea, pain, sleepiness)    Negative: Took another person's prescription drug    Negative: [1] DOUBLE DOSE (an extra dose or lesser amount) of prescription drug AND [2] NO symptoms (Exception: a double dose of antibiotics)    Negative: Diabetes drug error or overdose (e.g., insulin or extra dose)    Negative: [1] Request for URGENT new prescription or refill of \"essential\" medication (i.e., likelihood of harm to patient if not taken) AND [2] triager unable to fill per unit policy    Negative: [1] Prescription not at pharmacy AND [2] was prescribed today by PCP    Negative: Pharmacy calling with prescription questions and triager unable to answer question    Negative: Caller has urgent medication question about med that PCP prescribed and triager unable to answer question    Negative: Caller has NON-URGENT medication question about med that PCP prescribed and triager unable to answer question    Protocols used: MEDICATION QUESTION CALL-A-AH      "

## 2021-01-11 NOTE — LETTER
January 14, 2021      Anabel Moon  [address] [address]    To Whom It May Concern:     Anabel Moon is under my medical care.   Please excuse her from work until 1/18/2021.  Please call the clinic with questions/concerns at [phone].     Sincerely,               Angelica Mendez MD

## 2021-01-11 NOTE — TELEPHONE ENCOUNTER
Reason for Call:  Medication or medication refill:    Do you use a [facility]?  Name of the pharmacy and phone number for the current request:      [facility] [id number] [place] [facility] [place], [place] - [address] [place] [address]    Name of the medication requested: LORazepam (ATIVAN) 0.5 MG tablet     Other request: Pt misplaced her existing Rx    Can we leave a detailed message on this number?     Phone number patient can be reached at: Cell number on file:    Telephone Information:   Mobile [phone]     Best Time: any    Call taken on 1/11/2021 at 11:52 AM by Dawn Pena

## 2021-01-11 NOTE — LETTER
2021      Anabel Moon  [address] [address]      To Whom It May Concern:    Anabel Moon ( 1975) is under my medical care.   Please excuse her from work until 2021.  Please call the clinic with questions/concerns at [phone].    Sincerely,           Angelica Mendez MD

## 2021-01-11 NOTE — LETTER
[facility]  [address]  [address]  [place], [place] [address]  [phone]          To whom it may concern:    January 14, 2021      Anabel Moon  [address] [address]    To Whom It May Concern:     Anabel Moon is under my medical care. Please excuse her from work until 1/5/21 through 1/18/2021.  Please call the clinic with questions/concerns at [phone].       Sincerely,               Angelica Mendez MD

## 2021-01-12 NOTE — TELEPHONE ENCOUNTER
AS,  Please see below [employer] message and advise.  Started letter and routed to you   Thanks,  Marjorie ALONZO RN

## 2021-01-12 NOTE — RESULT ENCOUNTER NOTE
Dear Anabel    Normal Chest xray  And no signs of pneumonia    Please keep us posted with questions or concerns .      Best Regards,    Angelica Mendez MD  [facility]  [phone]

## 2021-01-12 NOTE — TELEPHONE ENCOUNTER
Outpatient Medication Detail     Disp Refills Start End ISABEL   LORazepam (ATIVAN) 0.5 MG tablet 10 tablet 0 1/11/2021  --   Sig - Route: Take 1 tablet (0.5 mg) by mouth daily as needed for anxiety - Oral   Sent to pharmacy as: LORazepam 0.5 MG Oral Tablet (ATIVAN)   Class: E-Prescribe   Order: [id number]   E-Prescribing Status: Receipt confirmed by pharmacy (1/11/2021  3:36 PM CST)   Printout Tracking    External Result Report   Pharmacy    [facility] [id number] [place] [facility] [place], [place] - [facility]   Associated Diagnoses    Anxiety attack [F41.0]

## 2021-01-13 NOTE — TELEPHONE ENCOUNTER
AS,  Please see below MyChart message and advise on additional questions about sleep   Thanks,  Marjorie ALONZO RN

## 2021-01-14 LAB
COPATH REPORT: NORMAL
PAP: NORMAL

## 2021-01-14 NOTE — TELEPHONE ENCOUNTER
As,  New letter pended with dates 1/5-1/18 per new pt request,.  Ok to refax later with updated dates?  Please advise.  Thanks,  Dominga Strong RN

## 2021-01-15 ENCOUNTER — MYC MEDICAL ADVICE (OUTPATIENT)
Dept: FAMILY MEDICINE | Facility: CLINIC | Age: 46
End: 2021-01-15

## 2021-01-15 ENCOUNTER — HEALTH MAINTENANCE LETTER (OUTPATIENT)
Age: 46
End: 2021-01-15

## 2021-01-15 DIAGNOSIS — F32.1 CURRENT MODERATE EPISODE OF MAJOR DEPRESSIVE DISORDER WITHOUT PRIOR EPISODE (H): Primary | ICD-10-CM

## 2021-01-15 LAB
FINAL DIAGNOSIS: NORMAL
HPV HR 12 DNA CVX QL NAA+PROBE: NEGATIVE
HPV16 DNA SPEC QL NAA+PROBE: NEGATIVE
HPV18 DNA SPEC QL NAA+PROBE: NEGATIVE
SPECIMEN DESCRIPTION: NORMAL
SPECIMEN SOURCE CVX/VAG CYTO: NORMAL

## 2021-01-15 RX ORDER — CITALOPRAM HYDROBROMIDE 10 MG/1
10 TABLET ORAL DAILY
Qty: 30 TABLET | Refills: 1 | Status: SHIPPED | OUTPATIENT
Start: 2021-01-15 | End: 2021-01-19

## 2021-01-19 ENCOUNTER — VIRTUAL VISIT (OUTPATIENT)
Dept: FAMILY MEDICINE | Facility: CLINIC | Age: 46
End: 2021-01-19
Payer: COMMERCIAL

## 2021-01-19 DIAGNOSIS — F41.9 ANXIETY: Primary | ICD-10-CM

## 2021-01-19 DIAGNOSIS — G47.09 OTHER INSOMNIA: ICD-10-CM

## 2021-01-19 PROCEDURE — 99214 OFFICE O/P EST MOD 30 MIN: CPT | Mod: 95 | Performed by: FAMILY MEDICINE

## 2021-01-19 ASSESSMENT — PATIENT HEALTH QUESTIONNAIRE - PHQ9: 5. POOR APPETITE OR OVEREATING: NEARLY EVERY DAY

## 2021-01-19 ASSESSMENT — ANXIETY QUESTIONNAIRES
5. BEING SO RESTLESS THAT IT IS HARD TO SIT STILL: NOT AT ALL
3. WORRYING TOO MUCH ABOUT DIFFERENT THINGS: MORE THAN HALF THE DAYS
2. NOT BEING ABLE TO STOP OR CONTROL WORRYING: NEARLY EVERY DAY
GAD7 TOTAL SCORE: 14
1. FEELING NERVOUS, ANXIOUS, OR ON EDGE: NEARLY EVERY DAY
7. FEELING AFRAID AS IF SOMETHING AWFUL MIGHT HAPPEN: NEARLY EVERY DAY
6. BECOMING EASILY ANNOYED OR IRRITABLE: NOT AT ALL
IF YOU CHECKED OFF ANY PROBLEMS ON THIS QUESTIONNAIRE, HOW DIFFICULT HAVE THESE PROBLEMS MADE IT FOR YOU TO DO YOUR WORK, TAKE CARE OF THINGS AT HOME, OR GET ALONG WITH OTHER PEOPLE: VERY DIFFICULT

## 2021-01-19 NOTE — PROGRESS NOTES
anabel is a 45 year old who is being evaluated via a billable video visit.      How would you like to obtain your AVS? Lisa  If the video visit is dropped, the invitation should be resent by: Lisa  Will anyone else be joining your video visit? No  Assessment and plan  Anabel is 45 year old female with anxiety & insomnia    1. Anxiety  Advised to increase the frequency of counseling, benefits of excercise & trying to staying busy with a hobby or any physical activity of choice during the day  Stop celexa- & we will start new selective serotonin reuptake inhibitor .  Follow up in 1-2 weeks because of the severity of her symptoms  - sertraline (ZOLOFT) 50 MG tablet; Take 1 tablet (50 mg) by mouth daily  Dispense: 30 tablet; Refill: 1    2. Other insomnia  We talked about benefits of sleep hygiene, avoiding naps during the day, avoiding excess screen time, kristina 30 mins prior to sleep..  Trazodone only made her tired.advised to stop it  Start over the counter doxylamine   - doxylamine (UNISOM SLEEPTABS) 25 MG TABS tablet; Take 1 tablet (25 mg) by mouth nightly as needed for sleep  Dispense: 30 tablet; Refill: 1      Letter for extension of her leave from work provided- [facility] given till 2/2/21      Potential medication side effects were discussed with the patient; let me know if any occur.    Video Start Time: 10:56 AM      Subjective     anabel is a 45 year old who presents to clinic today for the following health issues     HPI       Anxiety Follow-Up    How are you doing with your anxiety since your last visit? Worsened has not been able to sleep     Are you having other symptoms that might be associated with anxiety? Yes:  not being able to sleep     Have you had a significant life event? No     Are you feeling depressed? No    Do you have any concerns with your use of alcohol or other drugs? No    More anxious,fearful  of dying - constant  In over thinking. Never had that before-  until aunty sudden demise , in her mid 40's   Unable to sleep at all due to anxiety .  and has been taking trazodone 100mg at  Bedtime and still unable to sleep at all and started celexa 10 mg  at  > 1 weeks, she takes it at 3pm     Trazodone increased to 100 mg , felt too much fatigue  but still unable to sleep & feel tired during the day.  Had a session of therapy a week ago, has another follow up in 3 weeks.  Aundread passed away in dec. Had 2 weeks vacation in [place]- managed her anxiety- and was able to sleep but as soon as returned to [facility]- the anxiety and insomnia has been worse.      Need extension from  Her work as well.  Wondering if should start vitamin D, has some at home [address]     Social History     Tobacco Use     Smoking status: Never Smoker     Smokeless tobacco: Never Used   Substance Use Topics     Alcohol use: Never     Frequency: Never     Binge frequency: Never     Drug use: Never     AMANDA-7 SCORE 1/8/2021 1/19/2021   Total Score 21 14     PHQ 1/11/2021   PHQ-9 Total Score 14   Q9: Thoughts of better off dead/self-harm past 2 weeks Not at all           How many servings of fruits and vegetables do you eat daily?  0-1    On average, how many sweetened beverages do you drink each day (Examples: soda, juice, sweet tea, etc.  Do NOT count diet or artificially sweetened beverages)?   0    How many days per week do you exercise enough to make your heart beat faster? 4    How many minutes a day do you exercise enough to make your heart beat faster? 30 - 60    How many days per week do you miss taking your medication? 0      Review of Systems   Constitutional, HEENT, cardiovascular, pulmonary, GI, , musculoskeletal, neuro, skin, endocrine and psych systems are negative, except as otherwise noted.      Objective           Vitals:  No vitals were obtained today due to virtual visit.    Physical Exam   GENERAL: Healthy, alert and no distress  EYES: Eyes grossly normal to inspection.  No  discharge or erythema, or obvious scleral/conjunctival abnormalities.  RESP: No audible wheeze, cough, or visible cyanosis.  No visible retractions or increased work of breathing.    SKIN: Visible skin clear. No significant rash, abnormal pigmentation or lesions.  NEURO: Cranial nerves grossly intact.  Mentation and speech appropriate for age.  PSYCH: Mentation appears normal, affect normal/bright, judgement and insight intact, normal speech and appearance well-groomed.  PHQ 1/11/2021   PHQ-9 Total Score 14   Q9: Thoughts of better off dead/self-harm past 2 weeks Not at all     AMANDA-7 SCORE 1/8/2021 1/19/2021   Total Score 21 14             Video-Visit Details    Type of service:  Video Visit    Video End Time:11:17 AM    Originating Location (pt. Location): Home    Distant Location (provider location):  [facility]     Platform used for Video Visit: [employer]

## 2021-01-19 NOTE — LETTER
To whom it may concern:    January 14, 2021      Anabel Moon  [address] [address]    To Whom It May Concern:     Anabel oMon is under my medical care. Please excuse her from work until 1/5/21 through 1/18/2021.  Please call the clinic with questions/concerns at [phone].       Sincerely,         Angelica Mendez MD

## 2021-01-19 NOTE — PATIENT INSTRUCTIONS
1. Anxiety  Stop celexap -s start  - sertraline (ZOLOFT) 50 MG tablet; Take 1 tablet (50 mg) by mouth daily  Dispense: 30 tablet; Refill: 1    2. Other insomnia  Sleep hygiene  More excercise  More hobbies  Increase counsleing  - doxylamine (UNISOM SLEEPTABS) 25 MG TABS tablet; Take 1 tablet (25 mg) by mouth nightly as needed for sleep  Dispense: 30 tablet; Refill: 1

## 2021-01-19 NOTE — LETTER
To whom it may concern:    January 19, 2021      Anabel Moon  [address] [address]    To Whom It May Concern:     Anabel Moon is under my medical care.   Please excuse her from work until 1/5/21 through 2/02/2021.  Please call the clinic with questions/concerns at [phone].       Sincerely,              Angelica Mendez MD

## 2021-01-20 ASSESSMENT — ANXIETY QUESTIONNAIRES: GAD7 TOTAL SCORE: 14

## 2021-01-21 ENCOUNTER — MYC REFILL (OUTPATIENT)
Dept: FAMILY MEDICINE | Facility: CLINIC | Age: 46
End: 2021-01-21

## 2021-01-21 DIAGNOSIS — F41.0 ANXIETY ATTACK: ICD-10-CM

## 2021-01-22 NOTE — TELEPHONE ENCOUNTER
Routing refill request to provider for review/approval because:  Drug not on the [facility] refill protocol   M[facility]MP not working  Marjorie ALONZO RN    Last Written Prescription Date:  1/11/2021  Last Fill Quantity: 10,  # refills: 0   Last office visit: 1/11/2021 with prescribing provider:     Future Office Visit:

## 2021-01-26 RX ORDER — LORAZEPAM 0.5 MG/1
0.5 TABLET ORAL DAILY PRN
Qty: 10 TABLET | Refills: 0 | Status: CANCELLED | OUTPATIENT
Start: 2021-01-26

## 2021-02-01 ENCOUNTER — VIRTUAL VISIT (OUTPATIENT)
Dept: FAMILY MEDICINE | Facility: CLINIC | Age: 46
End: 2021-02-01
Payer: COMMERCIAL

## 2021-02-01 DIAGNOSIS — F41.9 ANXIETY: Primary | ICD-10-CM

## 2021-02-01 DIAGNOSIS — F32.1 MODERATE MAJOR DEPRESSION (H): ICD-10-CM

## 2021-02-01 DIAGNOSIS — F41.0 ANXIETY ATTACK: ICD-10-CM

## 2021-02-01 PROCEDURE — 99214 OFFICE O/P EST MOD 30 MIN: CPT | Mod: 95 | Performed by: FAMILY MEDICINE

## 2021-02-01 RX ORDER — SERTRALINE HYDROCHLORIDE 100 MG/1
100 TABLET, FILM COATED ORAL DAILY
Qty: 30 TABLET | Refills: 3 | Status: SHIPPED | OUTPATIENT
Start: 2021-02-01 | End: 2021-02-16

## 2021-02-01 RX ORDER — BUSPIRONE HYDROCHLORIDE 10 MG/1
10 TABLET ORAL 2 TIMES DAILY
Qty: 60 TABLET | Refills: 3 | Status: SHIPPED | OUTPATIENT
Start: 2021-02-01 | End: 2021-02-04

## 2021-02-01 ASSESSMENT — ANXIETY QUESTIONNAIRES
GAD7 TOTAL SCORE: 13
GAD7 TOTAL SCORE: 13
7. FEELING AFRAID AS IF SOMETHING AWFUL MIGHT HAPPEN: SEVERAL DAYS
2. NOT BEING ABLE TO STOP OR CONTROL WORRYING: NEARLY EVERY DAY
3. WORRYING TOO MUCH ABOUT DIFFERENT THINGS: NEARLY EVERY DAY
6. BECOMING EASILY ANNOYED OR IRRITABLE: NOT AT ALL
7. FEELING AFRAID AS IF SOMETHING AWFUL MIGHT HAPPEN: SEVERAL DAYS
1. FEELING NERVOUS, ANXIOUS, OR ON EDGE: NEARLY EVERY DAY
5. BEING SO RESTLESS THAT IT IS HARD TO SIT STILL: NOT AT ALL
4. TROUBLE RELAXING: NEARLY EVERY DAY
GAD7 TOTAL SCORE: 13

## 2021-02-01 ASSESSMENT — PATIENT HEALTH QUESTIONNAIRE - PHQ9
SUM OF ALL RESPONSES TO PHQ QUESTIONS 1-9: 11
SUM OF ALL RESPONSES TO PHQ QUESTIONS 1-9: 11
10. IF YOU CHECKED OFF ANY PROBLEMS, HOW DIFFICULT HAVE THESE PROBLEMS MADE IT FOR YOU TO DO YOUR WORK, TAKE CARE OF THINGS AT HOME, OR GET ALONG WITH OTHER PEOPLE: SOMEWHAT DIFFICULT

## 2021-02-01 NOTE — PROGRESS NOTES
PHQ 1/11/2021 2/1/2021   PHQ-9 Total Score 14 11   Q9: Thoughts of better off dead/self-harm past 2 weeks Not at all Not at all     AMANDA-7 SCORE 1/8/2021 1/19/2021 2/1/2021   Total Score - - 13 (moderate anxiety)   Total Score 21 14 13       Answers for HPI/ROS submitted by the patient on 2/1/2021   If you checked off any problems, how difficult have these problems made it for you to do your work, take care of things at home, or get along with other people?: Somewhat difficult  PHQ9 TOTAL SCORE: 11  AMANDA 7 TOTAL SCORE: 13

## 2021-02-01 NOTE — PATIENT INSTRUCTIONS
Resume work and try and stay busy- its is understandably challenging & helpful.  Advised to Increase sertraline  to 100 mg once a day  Add buspar/buspirone 1/2 tab or 1 tab- once daily or twice daily.  Ideally take it twice daily -unless medications is causing undesirable effects

## 2021-02-01 NOTE — PROGRESS NOTES
anabel is a 45 year old who is being evaluated via a billable video visit.      How would you like to obtain your AVS? Lisa  If the video visit is dropped, the invitation should be resent by: Lisa  Will anyone else be joining your video visit? No      Video Start Time: 10:35 AM  Assessment & Plan   Anabel is 45 year old female with complex mental health .anxiety/ anxiety attacks and reactive depression & this episode started in relation to history of covid 2 months ago and an aunty's death.  She trawled to [place] for 2 weeks , was ok but on return- flight diverted to [place] and she has emergency department eval because of chest pain- work up negative for cardiac, normal chest CT, negative for PE..  Ativan as needed  Helped- but stopped, as was to use only as needed      She has been on about 3 weeks of sertraline.  She has started counseling.  She has seen psych as well- and did not like prescribed Neurontin - so she has stopped that and has no plans to follow up with psychiatrist.    Anxiety    Resume work and try and stay busy- its is understandably challenging & helpful.  Advised to Increase sertraline  to 100 mg once a day  Add buspar/buspirone 1/2 tab or 1 tab- once daily or twice daily.  Ideally take it twice daily -unless medications is causing undesirable effects    - sertraline (ZOLOFT) 100 MG tablet  Dispense: 30 tablet; Refill: 3  - busPIRone (BUSPAR) 10 MG tablet  Dispense: 60 tablet; Refill: 3  - we discussed safety at home. She is back with sister and deneis any harmful thoughts for herself or for other.    Potential medication side effects were discussed with the patient; let me know if any occur.    Anxiety attack  - busPIRone (BUSPAR) 10 MG tablet  Dispense: 60 tablet; Refill: 3    Moderate major depression (H)  - sertraline (ZOLOFT) 100 MG tablet  Dispense: 30 tablet; Refill: 3      remote history hypothyrosim .  Of note she has seen endo- no avaliable records- and as per patient- all labs  normal but TSH is fluctuating.  She was given betablocker- inderal and she did not like it- so she stopped  Regarding history of hypothyroidism- she has never been on medications- since yr and the plan is to follow up with end in 3 months     30 minutes spent on the date of the encounter doing chart review, patient visit and documentation                Return in about 2 weeks (around 2/15/2021) for concerns,unresolved, virtual/video visit.    Angelica Mendez MD  [facility]    Briana avina is a 45 year old who presents to clinic today for the following health issues       HPI   40 mins of excercise  Tries to get out of the house for walk'  But anxiety is crippling - fight or flight type of reaction   Recurring thoughts  Anxiety all the time  Has plans to go back to work tomorrow.  She is able to work from  Home as [employer]   & willing to start from  Tomorrow.  She has seen the therapist saw once and next appointment is next week.  She has been taking sertraline 75 mg once daily & anxiety is still quite bad.      Anxiety attack - kristina in am  Was given propranolol/inderal by endocrinology this past week  She has not been diagnosed with hypothyroidism currently- blood tests were normal recently.  Endocrinologist- said all her symptoms are sign of thyroid fluctuation-& was advised that she should have thyroid rechecked every 3 month  Endo- also prescribed beta blocker & she could not handle it after a single dose 3 days ago- it caused heart burn.    Has been seeing Psychiatrist- Mickey-at [facility] since at least 2 weeks  Was prescribed gabapentin 400 mg four times daily on  -1/20 - could not handle it  Then she was prescribed gabapentin 100 mg, four times daily that she picked up on 1/26- took it for 2 days and that cuased her to be too drowsy so she stopped that as well.  Does not want to be on lorazepam as well.  Has not plans to follow up with them , does not like  medications to be pushed.            Depression and Anxiety Follow-Up    How are you doing with your depression since your last visit? About same     How are you doing with your anxiety since your last visit? About same     Are you having other symptoms that might be associated with depression or anxiety? No    Have you had a significant life event? No     Do you have any concerns with your use of alcohol or other drugs? No  Answers for HPI/ROS submitted by the patient on 2/1/2021   If you checked off any problems, how difficult have these problems made it for you to do your work, take care of things at home, or get along with other people?: Somewhat difficult  PHQ9 TOTAL SCORE: 11  AMANDA 7 TOTAL SCORE: 13    Social History     Tobacco Use     Smoking status: Never Smoker     Smokeless tobacco: Never Used   Substance Use Topics     Alcohol use: Never     Frequency: Never     Binge frequency: Never     Drug use: Never     PHQ 1/11/2021 2/1/2021   PHQ-9 Total Score 14 11   Q9: Thoughts of better off dead/self-harm past 2 weeks Not at all Not at all     AMANDA-7 SCORE 1/8/2021 1/19/2021 2/1/2021   Total Score - - 13 (moderate anxiety)   Total Score 21 14 13         Suicide Assessment Five-step Evaluation and Treatment (SAFE-T)      How many servings of fruits and vegetables do you eat daily?  0-1    On average, how many sweetened beverages do you drink each day (Examples: soda, juice, sweet tea, etc.  Do NOT count diet or artificially sweetened beverages)?   0    How many days per week do you exercise enough to make your heart beat faster? 3 or less    How many minutes a day do you exercise enough to make your heart beat faster? 9 or less    How many days per week do you miss taking your medication? 0      Review of Systems   Constitutional, HEENT, cardiovascular, pulmonary, GI, , musculoskeletal, neuro, skin, endocrine and psych systems are negative, except as otherwise noted.      Objective           Vitals:  No vitals  were obtained today due to virtual visit.    Physical Exam   GENERAL: Healthy, alert and no distress  EYES: Eyes grossly normal to inspection.  No discharge or erythema, or obvious scleral/conjunctival abnormalities.  RESP: No audible wheeze, cough, or visible cyanosis.  No visible retractions or increased work of breathing.    SKIN: Visible skin clear. No significant rash, abnormal pigmentation or lesions.  NEURO: Cranial nerves grossly intact.  Mentation and speech appropriate for age.  PSYCH: Mentation appears normal, affect normal/bright, judgement and insight intact, normal speech and appearance well-groomed.          Video-Visit Details    Type of service:  Video Visit    Video End Time:11:04 AM    Originating Location (pt. Location): Home    Distant Location (provider location):  [facility]     Platform used for Video Visit: Direct Access Software

## 2021-02-02 ENCOUNTER — MYC MEDICAL ADVICE (OUTPATIENT)
Dept: FAMILY MEDICINE | Facility: CLINIC | Age: 46
End: 2021-02-02

## 2021-02-02 ENCOUNTER — NURSE TRIAGE (OUTPATIENT)
Dept: NURSING | Facility: CLINIC | Age: 46
End: 2021-02-02

## 2021-02-02 DIAGNOSIS — F41.0 ANXIETY ATTACK: ICD-10-CM

## 2021-02-02 RX ORDER — LORAZEPAM 0.5 MG/1
0.5 TABLET ORAL DAILY PRN
Qty: 10 TABLET | Refills: 0 | Status: SHIPPED | OUTPATIENT
Start: 2021-02-02 | End: 2021-02-16

## 2021-02-02 ASSESSMENT — ANXIETY QUESTIONNAIRES: GAD7 TOTAL SCORE: 13

## 2021-02-02 NOTE — TELEPHONE ENCOUNTER
A.S.    Please see [employer] message below.  Do you want to do a video visit?    Liliana Quiles RN

## 2021-02-02 NOTE — TELEPHONE ENCOUNTER
GLORIA.S  Pt called in today and says she started the buspirone and feels more anxious.   Understands her symptoms may be worse before they get better.  Having a lot of anxiety right now and can't sleep.    Requesting a refill on the lorazepam in the mean time  Pended new Rx if ok with you?    Thank you,  EMANUEL Mcmanus

## 2021-02-02 NOTE — TELEPHONE ENCOUNTER
Anabel is calling regarding anxiety and states that Busbar was added by Angelica Interiano and states that she was up all night and cannot sleep.  Anabel feels that Ativan needs to be added to medication as she cannot function.  Anabel is suppose to work today and feels that she cannot work.  Anabel feels that Buspar is making her have more anxiety.   Anabel is requesting to speak with Angelica Interiano as soon as possible.  Please phone Anabel.    COVID 19 Nurse Triage Plan/Patient Instructions    Please be aware that novel coronavirus (COVID-19) may be circulating in the community. If you develop symptoms such as fever, cough, or SOB or if you have concerns about the presence of another infection including coronavirus (COVID-19), please contact your health care provider or visit [email or web address].     Disposition/Instructions    In-Person Visit with provider recommended. Reference Visit Selection Guide.    Thank you for taking steps to prevent the spread of this virus.  o Limit your contact with others.  o Wear a simple mask to cover your cough.  o Wash your hands well and often.    Resources    [facility]: About COVID-19: [email or web address]    CDC: What to Do If You're Sick: www.cdc.gov/coronavirus/2019-ncov/about/steps-when-sick.html    CDC: Ending Home Isolation: www.cdc.gov/coronavirus/2019-ncov/hcp/disposition-in-home-patients.html     CDC: Caring for Someone: www.cdc.gov/coronavirus/2019-ncov/if-you-are-sick/care-for-someone.html     [facility]: Interim Guidance for Hospital Discharge to Home: www.health.[facility]/diseases/coronavirus/hcp/hospdischarge.pdf    [facility] clinical trials (COVID-19 research studies): [email or web address]/um-clinical-trials     Below are the COVID-19 hotlines at the [place] Department of Health ([facility]). Interpreters are available.   o For health questions: Call 478-841-7070 or 1-958.272.9370 (7 a.m. to 7 p.m.)  o For questions about schools and childcare:  Call 351-724-6407 or 1-180.772.7319 (7 a.m. to 7 p.m.)                       Additional Information    Negative: Severe difficulty breathing (e.g., struggling for each breath, speaks in single words)    Negative: Bluish (or gray) lips or face now    Negative: Difficult to awaken or acting confused (e.g., disoriented, slurred speech)    Negative: Hysterical or combative behavior    Negative: Sounds like a life-threatening emergency to the triager    Negative: [1] Difficulty breathing AND [2] persists > 10 minutes AND [3] not relieved by reassurance provided by triager    Negative: [1] Lightheadedness or dizziness AND [2] persists > 10 minutes AND [3] not relieved by reassurance provided by triager    Negative: [1] Alcohol or drug abuse, known or suspected AND [2] feeling very shaky (i.e., visible tremors of hands)    Negative: Patient sounds very sick or weak to the triager    Symptoms interfere with work or school    Protocols used: ANXIETY AND PANIC ATTACK-A-AH

## 2021-02-02 NOTE — TELEPHONE ENCOUNTER
Please call patient & make a brenton with me this week or  within 1 week.  Refill for lorazepam is given, use only as needed .  Thanks

## 2021-02-04 ENCOUNTER — APPOINTMENT (OUTPATIENT)
Dept: LAB | Facility: CLINIC | Age: 46
End: 2021-02-04
Payer: COMMERCIAL

## 2021-02-04 ENCOUNTER — VIRTUAL VISIT (OUTPATIENT)
Dept: FAMILY MEDICINE | Facility: CLINIC | Age: 46
End: 2021-02-04
Payer: COMMERCIAL

## 2021-02-04 DIAGNOSIS — F41.9 ANXIETY: ICD-10-CM

## 2021-02-04 DIAGNOSIS — F41.0 ANXIETY ATTACK: ICD-10-CM

## 2021-02-04 DIAGNOSIS — K21.00 GASTROESOPHAGEAL REFLUX DISEASE WITH ESOPHAGITIS WITHOUT HEMORRHAGE: Primary | ICD-10-CM

## 2021-02-04 PROBLEM — R12 HEART BURN: Status: ACTIVE | Noted: 2021-02-04

## 2021-02-04 PROCEDURE — 83690 ASSAY OF LIPASE: CPT | Performed by: FAMILY MEDICINE

## 2021-02-04 PROCEDURE — 87338 HPYLORI STOOL AG IA: CPT | Performed by: FAMILY MEDICINE

## 2021-02-04 PROCEDURE — 84443 ASSAY THYROID STIM HORMONE: CPT | Performed by: FAMILY MEDICINE

## 2021-02-04 PROCEDURE — 36415 COLL VENOUS BLD VENIPUNCTURE: CPT | Performed by: FAMILY MEDICINE

## 2021-02-04 PROCEDURE — 99215 OFFICE O/P EST HI 40 MIN: CPT | Mod: GT | Performed by: FAMILY MEDICINE

## 2021-02-04 PROCEDURE — 80053 COMPREHEN METABOLIC PANEL: CPT | Performed by: FAMILY MEDICINE

## 2021-02-04 RX ORDER — BUSPIRONE HYDROCHLORIDE 10 MG/1
10 TABLET ORAL 3 TIMES DAILY
Qty: 60 TABLET | Refills: 3 | COMMUNITY
Start: 2021-02-04 | End: 2021-02-16

## 2021-02-04 RX ORDER — PANTOPRAZOLE SODIUM 40 MG/1
40 TABLET, DELAYED RELEASE ORAL DAILY
Qty: 30 TABLET | Refills: 1 | Status: SHIPPED | OUTPATIENT
Start: 2021-02-04 | End: 2021-04-12

## 2021-02-04 NOTE — PATIENT INSTRUCTIONS
gastroesophageal reflux disease  Avoid fatty food  Avoid lying soon after meals  Raise head end of the bed by 6 inch block if possible  Start protonix -40 mg once daily & wait 30-60 mins to eat to activate the medications  Lab only appointment today at 5 pm to get blood & stool tests      Increase buspar 10 mg three times daily or now.    Please call ;[facility] - [phone]-[facility] are covered by your insurance for therapist and or psychiatrist as well

## 2021-02-04 NOTE — NURSING NOTE
"Chief Complaint   Patient presents with     Anxiety     initial LMP 01/25/2021  Estimated body mass index is 28.27 kg/m  as calculated from the following:    Height as of 1/11/21: 1.69 m (5' 6.54\").    Weight as of 1/11/21: 80.7 kg (178 lb).  BP completed using cuff size: .  L  R arm      Health Maintenance that is potentially due pending provider review:      Harpreet Barbour ma  "

## 2021-02-04 NOTE — PROGRESS NOTES
anabel is a 45 year old who is being evaluated via a billable video visit.    Multiple attempts and voice messages - sent via InfraSearch and I was not able to connect with patient- correct phone number was used [phone]  Finally a new number was given by triage nurse to connect as patient unable to access her number and I was able to connect after 2 hrs from her appointment time   How would you like to obtain your AVS? MyChart  If the video visit is dropped, the invitation should be resent by: Text to cell phone: [phone]  Will anyone else be joining your video visit? No    Finally was able to connect 4;03 pm  Video ended 4;26, via In[employer]Search  [facility] [place]    Assessment & Plan      Anabel is 45 year old female with complex  Medical history         Gastroesophageal reflux disease   Assessment:   Assessment: on going heart burn, DDx include gastroesophageal reflux disease,pancraetic, gall bladder pathology. Given her symptoms its most likely gastroesophageal reflux disease , she is not on any NSAID, no alcohol consumption. Advised proton pump inhibitors  Previously tried omeprazole and Nexium but stopped within 2-7 days . I did explain benefit happens with proton pump inhibitors over time, as long as symptoms not any worse and she is able to change diet than keep taking proton pump inhibitors - changed it to Protonix today and Gastroenterology referral is sent as well.  - pantoprazole (PROTONIX) 40 MG EC tablet; Take 1 tablet (40 mg) by mouth daily  Dispense: 30 tablet; Refill: 1  -Gastroenterology referal is sent    She will come today for lab only appointment for blood test for gall bladder , pancrease pathology.  Also stool test for History of presenting illness   - Comprehensive metabolic panel (BMP + Alb, Alk Phos, ALT, AST, Total. Bili, TP); Future  - Lipase; Future  - **TSH with free T4 reflex FUTURE anytime; Future  - Helicobacter pylori Antigen Stool; Future      Avoid fatty  food  Avoid lying soon after meals  Raise head end of the bed by 6 inch block if possible  Start protonix -40 mg once daily & wait 30-60 mins to eat to activate the medications  Lab only appointment today at 5 pm to get blood & stool tests                  Anxiety & anxiety attacks   Complex mental health history -, no manic episodes but selective serotonin reuptake inhibitor- not much helpful  She has  esclating anxiety and anxiety attacks  She reports due to mental health- she is unable to resume work & its appropriate to give longer excuse from work for now but I do not have any FMLA sent from  Her work and will have to discuss that in follow up appointment next week    Unable to get in with therapist through [place]- till April 19th- gave her options to find other psychologist as long as they are covered and she will call  Advised to increase  buspar/buspirone three times daily at 10 mg and see if that helps  Avid daily jameson of ativan  continue with   - sertraline (ZOLOFT) 100 mg once daily    - busPIRone (BUSPAR) 10 MG tablet; Take 1 tablet (10 mg) by mouth 3 times daily  Dispense: 60 tablet; Refill: 3    - we discussed safety at home. She is currently at [facility] place [facility] and reports she has great family and friends support    deneis any harmful thoughts for herself or for other.             Moderate major depression (H)  - sertraline (ZOLOFT) 100 MG tablet  Dispense: 30 tablet; Refill: 3        Remote history hypothyrosim .  Of note she has seen endo- no avaliable records  - and as per patient- all labs normal but TSH is fluctuating.  She was given betablocker- inderal and she did not like it- so she stopped  Regarding history of hypothyroidism- she has never been on medications- s  I do recommend that we recheck tsh today with lab only appointment       55  minutes spent on the date of the encounter doing chart review, patient visit and documentation          Subjective     kailyn is a 45 year  old who presents to clinic today for the following health issues     HPI     History of presenting illness    Complains of heart burn, burping moderate to sever  - has been going on for a while but worse for a few days, woke her up a fewnight  Aurelio after deep friend chicken and cauliflowers fries last night.  Tried Nexium , omeprazole nothing helps- tried each for 2-7 days and stopped   No alcohol abuse , nonsmoker. No nausea,vomiting,diarrhea. No blood from any orifice      Nothing is helping with anxiety  Except family and friend being around people helps    Trying to go to the gym,but wakes up with anxiety attack  Buspar help some but not for long  Every morning, worse anxiety- as wakes up- ativan helped.  Unable to return to work, wants FMLA filled out- no paper work sent to her or our office    Has tried psychiatrist at [facility]_ does not want to  Go back was prescribed Neurontin   Unable to get counseling/ therapy via [facility] first avaliable appointment is April 13           How many servings of fruits and vegetables do you eat daily?      On average, how many sweetened beverages do you drink each day (Examples: soda, juice, sweet tea, etc.  Do NOT count diet or artificially sweetened beverages)?       How many days per week do you exercise enough to make your heart beat faster?     How many minutes a day do you exercise enough to make your heart beat faster?     How many days per week do you miss taking your medication?         Review of Systems         Objective           Vitals:  No vitals were obtained today due to virtual visit.    Physical Exam     GENERAL: Healthy, alert and no distress  EYES: Eyes grossly normal to inspection.  No discharge or erythema, or obvious scleral/conjunctival abnormalities.  RESP: No audible wheeze, cough, or visible cyanosis.  No visible retractions or increased work of breathing.    SKIN: Visible skin clear. No significant rash, abnormal pigmentation or lesions.  NEURO: Cranial  nerves grossly intact.  Mentation and speech appropriate for age.  PSYCH: Mentation appears normal, affect normal/bright, judgement and insight intact, normal speech and appearance well-groomed.                      Video-Visit Details    Type of service:  Video Visit    Video End Time:4;26 pm     Originating Location (pt. Location): friend home - Phone number used [phone]- as per patient request      Distant Location (provider location):  [facility]     Platform used for Video Visit: [facility]

## 2021-02-05 ENCOUNTER — TELEPHONE (OUTPATIENT)
Dept: FAMILY MEDICINE | Facility: CLINIC | Age: 46
End: 2021-02-05

## 2021-02-05 LAB
ALBUMIN SERPL-MCNC: 3.5 G/DL (ref 3.4–5)
ALP SERPL-CCNC: 64 U/L (ref 40–150)
ALT SERPL W P-5'-P-CCNC: 20 U/L (ref 0–50)
ANION GAP SERPL CALCULATED.3IONS-SCNC: 7 MMOL/L (ref 3–14)
AST SERPL W P-5'-P-CCNC: 17 U/L (ref 0–45)
BILIRUB SERPL-MCNC: 0.3 MG/DL (ref 0.2–1.3)
BUN SERPL-MCNC: 7 MG/DL (ref 7–30)
CALCIUM SERPL-MCNC: 9.5 MG/DL (ref 8.5–10.1)
CHLORIDE SERPL-SCNC: 103 MMOL/L (ref 94–109)
CO2 SERPL-SCNC: 25 MMOL/L (ref 20–32)
CREAT SERPL-MCNC: 0.61 MG/DL (ref 0.52–1.04)
GFR SERPL CREATININE-BSD FRML MDRD: >90 ML/MIN/{1.73_M2}
GLUCOSE SERPL-MCNC: 92 MG/DL (ref 70–99)
LIPASE SERPL-CCNC: 127 U/L (ref 73–393)
POTASSIUM SERPL-SCNC: 4.1 MMOL/L (ref 3.4–5.3)
PROT SERPL-MCNC: 7.6 G/DL (ref 6.8–8.8)
SODIUM SERPL-SCNC: 135 MMOL/L (ref 133–144)
TSH SERPL DL<=0.005 MIU/L-ACNC: 2.84 MU/L (ref 0.4–4)

## 2021-02-05 NOTE — TELEPHONE ENCOUNTER
----- Message from Angelica Mendez MD sent at 2/4/2021  8:27 PM CST -----  Regarding: change appointment for tuesday 02/09-with me  to OV instead of VV  Hello,    I think I sent an earlier message to TC to change her upcomming appointment with me from  VV to OV- patient knows.     Thanks

## 2021-02-08 ENCOUNTER — TELEPHONE (OUTPATIENT)
Dept: FAMILY MEDICINE | Facility: CLINIC | Age: 46
End: 2021-02-08

## 2021-02-08 LAB — H PYLORI AG STL QL IA: POSITIVE

## 2021-02-08 NOTE — TELEPHONE ENCOUNTER
Reason for Call:  Form, our goal is to have forms completed with 72 hours, however, some forms may require a visit or additional information.    Type of letter, form or note:  FMLA form    Who is the form from?: [employer] (if other please explain)    Where did the form come from: form was faxed in    What clinic location was the form placed at?: [facility]    Where the form was placed: [employer] Box/Folder    What number is listed as a contact on the form?:  [phone]       Additional comments:     Call taken on 2/8/2021 at 10:20 AM by Fran Bella

## 2021-02-09 ENCOUNTER — TELEPHONE (OUTPATIENT)
Dept: FAMILY MEDICINE | Facility: CLINIC | Age: 46
End: 2021-02-09

## 2021-02-09 ENCOUNTER — OFFICE VISIT (OUTPATIENT)
Dept: FAMILY MEDICINE | Facility: CLINIC | Age: 46
End: 2021-02-09
Payer: COMMERCIAL

## 2021-02-09 ENCOUNTER — MYC MEDICAL ADVICE (OUTPATIENT)
Dept: FAMILY MEDICINE | Facility: CLINIC | Age: 46
End: 2021-02-09

## 2021-02-09 VITALS
DIASTOLIC BLOOD PRESSURE: 82 MMHG | TEMPERATURE: 98.1 F | HEIGHT: 67 IN | WEIGHT: 172 LBS | BODY MASS INDEX: 27 KG/M2 | OXYGEN SATURATION: 97 % | SYSTOLIC BLOOD PRESSURE: 122 MMHG | HEART RATE: 62 BPM

## 2021-02-09 DIAGNOSIS — F41.9 ANXIETY: ICD-10-CM

## 2021-02-09 DIAGNOSIS — A04.8 H. PYLORI INFECTION: Primary | ICD-10-CM

## 2021-02-09 DIAGNOSIS — F32.1 MODERATE MAJOR DEPRESSION (H): ICD-10-CM

## 2021-02-09 DIAGNOSIS — F41.0 ANXIETY ATTACK: ICD-10-CM

## 2021-02-09 PROBLEM — F41.1 GAD (GENERALIZED ANXIETY DISORDER): Status: ACTIVE | Noted: 2021-02-01

## 2021-02-09 PROCEDURE — 99215 OFFICE O/P EST HI 40 MIN: CPT | Mod: 95 | Performed by: FAMILY MEDICINE

## 2021-02-09 PROCEDURE — 96127 BRIEF EMOTIONAL/BEHAV ASSMT: CPT | Performed by: FAMILY MEDICINE

## 2021-02-09 RX ORDER — LORAZEPAM 0.5 MG/1
0.5 TABLET ORAL DAILY PRN
Qty: 10 TABLET | Refills: 0 | Status: CANCELLED | OUTPATIENT
Start: 2021-02-09

## 2021-02-09 RX ORDER — BUSPIRONE HYDROCHLORIDE 10 MG/1
10 TABLET ORAL 3 TIMES DAILY
Qty: 60 TABLET | Refills: 3 | Status: CANCELLED | OUTPATIENT
Start: 2021-02-09

## 2021-02-09 RX ORDER — CLARITHROMYCIN 500 MG
500 TABLET ORAL 2 TIMES DAILY
Qty: 28 TABLET | Refills: 0 | Status: SHIPPED | OUTPATIENT
Start: 2021-02-09 | End: 2021-02-23

## 2021-02-09 RX ORDER — CLARITHROMYCIN 500 MG
500 TABLET ORAL 2 TIMES DAILY
Qty: 28 TABLET | Refills: 0 | Status: CANCELLED | OUTPATIENT
Start: 2021-02-09

## 2021-02-09 RX ORDER — LANSOPRAZOLE 30 MG/1
30 TABLET, ORALLY DISINTEGRATING, DELAYED RELEASE ORAL 2 TIMES DAILY
Qty: 28 TABLET | Refills: 0 | Status: SHIPPED | OUTPATIENT
Start: 2021-02-09 | End: 2021-02-10

## 2021-02-09 RX ORDER — SERTRALINE HYDROCHLORIDE 100 MG/1
100 TABLET, FILM COATED ORAL DAILY
Qty: 30 TABLET | Refills: 3 | Status: CANCELLED | OUTPATIENT
Start: 2021-02-09

## 2021-02-09 RX ORDER — AMOXICILLIN 500 MG/1
1000 CAPSULE ORAL 2 TIMES DAILY
Qty: 56 CAPSULE | Refills: 0 | Status: SHIPPED | OUTPATIENT
Start: 2021-02-09 | End: 2021-02-23

## 2021-02-09 RX ORDER — AZITHROMYCIN 500 MG/1
1000 TABLET, FILM COATED ORAL DAILY
Qty: 28 TABLET | Refills: 0 | Status: CANCELLED | OUTPATIENT
Start: 2021-02-09

## 2021-02-09 ASSESSMENT — ANXIETY QUESTIONNAIRES
IF YOU CHECKED OFF ANY PROBLEMS ON THIS QUESTIONNAIRE, HOW DIFFICULT HAVE THESE PROBLEMS MADE IT FOR YOU TO DO YOUR WORK, TAKE CARE OF THINGS AT HOME, OR GET ALONG WITH OTHER PEOPLE: VERY DIFFICULT
1. FEELING NERVOUS, ANXIOUS, OR ON EDGE: NEARLY EVERY DAY
2. NOT BEING ABLE TO STOP OR CONTROL WORRYING: NEARLY EVERY DAY
GAD7 TOTAL SCORE: 15
5. BEING SO RESTLESS THAT IT IS HARD TO SIT STILL: SEVERAL DAYS
3. WORRYING TOO MUCH ABOUT DIFFERENT THINGS: NEARLY EVERY DAY
6. BECOMING EASILY ANNOYED OR IRRITABLE: SEVERAL DAYS
7. FEELING AFRAID AS IF SOMETHING AWFUL MIGHT HAPPEN: MORE THAN HALF THE DAYS

## 2021-02-09 ASSESSMENT — MIFFLIN-ST. JEOR: SCORE: 1459.88

## 2021-02-09 ASSESSMENT — PATIENT HEALTH QUESTIONNAIRE - PHQ9
SUM OF ALL RESPONSES TO PHQ QUESTIONS 1-9: 14
5. POOR APPETITE OR OVEREATING: MORE THAN HALF THE DAYS

## 2021-02-09 NOTE — TELEPHONE ENCOUNTER
Spoke with pharmacy  The Prevpac is too expensive so they do need individual prescriptions for the azithromycin, clarithromycin and lansoprazole.    There are no specific dosages for the clarithromycin and lansoprazole.  Started new rx's please review dosages and complete.    Thank you,  Marietta Castillo RN

## 2021-02-09 NOTE — NURSING NOTE
"Chief Complaint   Patient presents with     Anxiety     initial /82 (BP Location: Right arm, Cuff Size: Adult Regular)   Pulse 62   Temp 98.1  F (36.7  C) (Tympanic)   Ht 1.689 m (5' 6.5\")   Wt 78 kg (172 lb)   LMP 01/25/2021   SpO2 97%   BMI 27.35 kg/m   Estimated body mass index is 27.35 kg/m  as calculated from the following:    Height as of this encounter: 1.689 m (5' 6.5\").    Weight as of this encounter: 78 kg (172 lb).  BP completed using cuff size: regular.  R arm      Health Maintenance that is potentially due pending provider review:  NONE    n/a    Harpreet Barbour ma  "

## 2021-02-09 NOTE — PATIENT INSTRUCTIONS
1. H. pylori infection    - lyeowqycv-oupvzdzhw-kpdhdiirv (PREVPAC) miscellaneous box; Twice daily . Or give individual generic amox 1gm bib,clarith twice daily, lasnopraz twice daily-if more cost effective  Dispense: 28 each; Refill: 0  Disability forms signed till 03/09/21  2. Anxiety  Sertraline 100 mg once daily.  Increase buuspar three times daily   Make an appointment earlier in 2 weeks if not feeling better  - EMOTIONAL / BEHAVIORAL ASSESSMENT    3. Moderate major depression (H)      4. Anxiety attack    [facility]   [phone]

## 2021-02-09 NOTE — TELEPHONE ENCOUNTER
Forms completed and signed in [place]  Faxed and sent for scanning  Marcela [facility] Unit Coordinator

## 2021-02-09 NOTE — TELEPHONE ENCOUNTER
Done- thanks  1. H. pylori infection    - LANsoprazole (PREVACID SOLUTAB) 30 MG ODT; Take 1 tablet (30 mg) by mouth 2 times daily  Dispense: 28 tablet; Refill: 0  - clarithromycin (BIAXIN) 500 MG tablet; Take 1 tablet (500 mg) by mouth 2 times daily for 14 days  Dispense: 28 tablet; Refill: 0  - amoxicillin (AMOXIL) 500 MG capsule; Take 2 capsules (1,000 mg) by mouth 2 times daily for 14 days  Dispense: 56 capsule; Refill: 0

## 2021-02-09 NOTE — PROGRESS NOTES
Assessment & Plan     H. pylori infection  Discussed in detail the benefit of treating the infection to help with gastroesophageal reflux disease  Symptoms.  Not uncommon infection- colonizes the gut mucosa in 50 % of WORLD WIDE  population & 30-40% OF [place] POPULATION-  Treatment with triple agent therapy   amoxicill-1 gm twice daily  clarithro-500 mg twice daily  lansopraz (PREVPAC)  1 tab twice daily    Dispense: 28 each; Refill: 0  After finishing above treatment - continue with proton pump inhibitors .  She did call Gastroenterology on her own- they do not see gastroesophageal reflux disease/ Helicobacter Pylori   patient in person- virtual visit was offered and patient declined.    Anxiety & Moderate major depression (H)  Disabling symptoms. Moderate.   medications reviewed - has been on sertraline only for a few days at 100 mg once daily .  Advised to increase Buspar to three times daily    She was see psychiatrist at [facility] in recent past.  She is also given option to see another psychiatrist. And or MTM/gensight mapping, she will think it over and let me know  .    - EMOTIONAL / BEHAVIORAL ASSESSMENT    Anxiety attack  lorazepam # 10 picked up by patient on 2/2- used only a few times  - EMOTIONAL / BEHAVIORAL ASSESSMENT    She reports she feel safe and connected with friends and family.  & reassured to call back earlier as needed  For mood or medications concerns   Follow up in 2 weeks for mental gerber if more concerns otherwise ok to be seen in 4 weeks      50 minutes spent on the date of the encounter doing chart review, history and exam, documentation and further activities as noted above         Potential medication side effects were discussed with the patient; let me know if any occur.        Return in about 4 weeks (around 3/9/2021) for virtual/video visit, concerns,unresolved.    Angelica Mendez MD  [facility]    Briana avina is a 45 year old who presents to clinic today  for the following health issues     HPI       Anxiety & depression Follow-Up  Forms for short term disability    How are you doing with your anxiety since your last visit? Improved     Today feeling a bit better.    Still having anxiety & depression    Anxiety is worse due to work.    Still getting calls/ emails from work about issues.    Has applies for short term disability.    Not ready to return to work.wondering if 3 months enough to be off.    Supportive family and friends- that helps.    Unable to get in counseling sooner     Buspar helps more, has taken it twice daily.    Wondering if zoloft too high.    Often second doubting circumstances    questions about Helicobacter Pylori  , wondering if that alone is the cause of disabling anxiety & depression     Are you having other symptoms that might be associated with anxiety? No    Have you had a significant life event? No     Are you feeling depressed? No    Do you have any concerns with your use of alcohol or other drugs? No    Social History     Tobacco Use     Smoking status: Never Smoker     Smokeless tobacco: Never Used   Substance Use Topics     Alcohol use: Never     Frequency: Never     Binge frequency: Never     Drug use: Never     AMANDA-7 SCORE 1/19/2021 2/1/2021 2/9/2021   Total Score - 13 (moderate anxiety) -   Total Score 14 13 15     PHQ 1/11/2021 2/1/2021 2/9/2021   PHQ-9 Total Score 14 11 14   Q9: Thoughts of better off dead/self-harm past 2 weeks Not at all Not at all Not at all     Wt Readings from Last 5 Encounters:   02/09/21 78 kg (172 lb)   01/11/21 80.7 kg (178 lb)   01/08/21 83.9 kg (185 lb)   01/07/21 86.2 kg (190 lb)   10/25/20 86.2 kg (190 lb)         How many servings of fruits and vegetables do you eat daily?  2-3    On average, how many sweetened beverages do you drink each day (Examples: soda, juice, sweet tea, etc.  Do NOT count diet or artificially sweetened beverages)?   0    How many days per week do you exercise enough to make  "your heart beat faster? 4    How many minutes a day do you exercise enough to make your heart beat faster? 30 - 60    How many days per week do you miss taking your medication? 0        Review of Systems had OBG/GYN consult- for hormone balance- [facility]  - a friend referred her   All labs normal  & reviewed on care everywhere          Objective    /82 (BP Location: Right arm, Cuff Size: Adult Regular)   Pulse 62   Temp 98.1  F (36.7  C) (Tympanic)   Ht 1.689 m (5' 6.5\")   Wt 78 kg (172 lb)   LMP 01/25/2021   SpO2 97%   BMI 27.35 kg/m    Body mass index is 27.35 kg/m .  Physical Exam   GENERAL: healthy, alert and no distress.  RESP: lungs clear to auscultation - no rales, rhonchi or wheezes  CV: regular rate and rhythm,ABDOMEN: soft, nontender, no hepatosplenomegaly, no masses and bowel sounds normal  PSYCH: mentation appears normal, affect normal/bright                  "

## 2021-02-10 ENCOUNTER — TELEPHONE (OUTPATIENT)
Dept: FAMILY MEDICINE | Facility: CLINIC | Age: 46
End: 2021-02-10

## 2021-02-10 DIAGNOSIS — A04.8 H. PYLORI INFECTION: Primary | ICD-10-CM

## 2021-02-10 DIAGNOSIS — K21.00 GASTROESOPHAGEAL REFLUX DISEASE WITH ESOPHAGITIS WITHOUT HEMORRHAGE: ICD-10-CM

## 2021-02-10 RX ORDER — PANTOPRAZOLE SODIUM 40 MG/1
40 TABLET, DELAYED RELEASE ORAL 2 TIMES DAILY
Qty: 30 TABLET | Refills: 0 | Status: SHIPPED | OUTPATIENT
Start: 2021-02-10 | End: 2021-02-23

## 2021-02-10 ASSESSMENT — ANXIETY QUESTIONNAIRES: GAD7 TOTAL SCORE: 15

## 2021-02-10 NOTE — ASSESSMENT & PLAN NOTE
sertralne 100 mg qd.  Buspar 10 mg ok to increase to THREE TIMES DAILY.  encouraged therapy and psych consult

## 2021-02-10 NOTE — TELEPHONE ENCOUNTER
Dear Triage,- I have sent mychart message as well   This may be a duplicate message   Please call patient- take the individual antibiotics as prevpac- not covered    See medications list.  Ok to take protonix twice daily- I send more- she already was taking them and prevacid is not covered & its ok to substitute

## 2021-02-10 NOTE — TELEPHONE ENCOUNTER
I have sent my chart    Take the individual antibiotic instaedof prevpac- due to cost. See medications list.  Ok to take protonix twice daily- I send more- she already was taking them and prevacid is not covered & its ok to substitute

## 2021-02-10 NOTE — TELEPHONE ENCOUNTER
A.S.    Patient called.  Lansoprazole is not covered by her insurance.    Need an alternative.  Thanks,  Liliana Quiles RN

## 2021-02-10 NOTE — TELEPHONE ENCOUNTER
A.S  Per pharmacy the lansoprazole is not covered by insurance  They said either omeprazole or pantoprazole is.    Please send in alternative to the lansoprazole.    Thank you,  Marietta Castillo RN

## 2021-02-15 ENCOUNTER — TELEPHONE (OUTPATIENT)
Dept: FAMILY MEDICINE | Facility: CLINIC | Age: 46
End: 2021-02-15

## 2021-02-15 NOTE — TELEPHONE ENCOUNTER
Reason for Call:  Form, our goal is to have forms completed with 72 hours, however, some forms may require a visit or additional information.    Type of letter, form or note:  disabilty form    Who is the form from?: [employer] (if other please explain)    Where did the form come from: form was faxed in    What clinic location was the form placed at?: [facility]    Where the form was placed: [employer]  Box/Folder    What number is listed as a contact on the form?:  [phone]       Additional comments:     Call taken on 2/15/2021 at 3:42 PM by Fran Bella

## 2021-02-16 ENCOUNTER — OFFICE VISIT (OUTPATIENT)
Dept: FAMILY MEDICINE | Facility: CLINIC | Age: 46
End: 2021-02-16
Payer: COMMERCIAL

## 2021-02-16 VITALS
DIASTOLIC BLOOD PRESSURE: 72 MMHG | OXYGEN SATURATION: 98 % | SYSTOLIC BLOOD PRESSURE: 110 MMHG | HEIGHT: 67 IN | WEIGHT: 169 LBS | TEMPERATURE: 96.3 F | RESPIRATION RATE: 16 BRPM | HEART RATE: 78 BPM | BODY MASS INDEX: 26.53 KG/M2

## 2021-02-16 DIAGNOSIS — A04.8 H. PYLORI INFECTION: ICD-10-CM

## 2021-02-16 DIAGNOSIS — R12 HEART BURN: ICD-10-CM

## 2021-02-16 DIAGNOSIS — F32.1 MODERATE MAJOR DEPRESSION (H): ICD-10-CM

## 2021-02-16 DIAGNOSIS — F41.9 ANXIETY: Primary | ICD-10-CM

## 2021-02-16 PROCEDURE — 99214 OFFICE O/P EST MOD 30 MIN: CPT | Performed by: FAMILY MEDICINE

## 2021-02-16 RX ORDER — SERTRALINE HYDROCHLORIDE 100 MG/1
100 TABLET, FILM COATED ORAL DAILY
Qty: 30 TABLET | Refills: 3 | Status: SHIPPED | OUTPATIENT
Start: 2021-02-16 | End: 2021-02-16

## 2021-02-16 RX ORDER — CITALOPRAM HYDROBROMIDE 20 MG/1
20 TABLET ORAL DAILY
Qty: 30 TABLET | Refills: 3 | Status: SHIPPED | OUTPATIENT
Start: 2021-02-16 | End: 2021-02-23

## 2021-02-16 ASSESSMENT — MIFFLIN-ST. JEOR: SCORE: 1446.27

## 2021-02-16 NOTE — PATIENT INSTRUCTIONS
Stop sertraline- do not dispense that and start celexa  See MTM and or psychiatrst  Follow up with me next week

## 2021-02-16 NOTE — PROGRESS NOTES
Assessment & Plan     Anxiety & anxiety attacks  Assessment.  45-year-old female, multiple anxiety attacks worsening anxiety since 2 months.  She has been on sertraline since a month without any improvement of symptoms.  We discussed changing selective serotonin reuptake inhibitor  - citalopram (CELEXA) 20 MG tablet  Dispense: 30 tablet; Refill: 3  Ok to use alprazolam only as needed  2/2/21 given 10 tabs    -I also recommend to see pharmacologist and MTM referral is sent.  Follow-up is recommended in 1 to 2 weeks she will make her own appointment.     acupuncture referral is also given    Due to ongoing anxiety and mental health concerns short-term disability forms are signed and she is excused from work till March 16 that is 4 weeks from now, for SSRI to be more effective.  - [facility]ERR[facility]      Moderate major depression (H)  As above  - [facility] REFERR[facility]  - citalopram (CELEXA) 20 MG tablet  Dispense: 30 tablet; Refill: 3  PHQ 1/11/2021 2/1/2021 2/9/2021   PHQ-9 Total Score 14 11 14   Q9: Thoughts of better off dead/self-harm past 2 weeks Not at all Not at all Not at all     AMANDA-7 SCORE 1/19/2021 2/1/2021 2/9/2021   Total Score - 13 (moderate anxiety) -   Total Score 14 13 15   Potential medication side effects were discussed with the patient; let me know if any occur.      H. pylori infection  Amoxicillin and vancomycin started last week.  She is also advised to take Protonix twice daily 2 weeks.    Heart burn  Okay to proceed with virtual visit with GI for further reassurance  - GASTROENTEROLOGY ADULT REF CONSULT ONLY        30 excuse nearly can ask a sample minutes spent on the date of the encounter doing chart review, history and exam, documentation and further activities as noted above      Return in about 1 week (around 2/23/2021) for virtual/video visit.    Angelica Mendez MD  [facility]   kailyn is a 45 year old who  presents for the following health issues     HPI       Anxiety Follow-Up    How are you doing with your anxiety since your last visit? Worsened     Are you having other symptoms that might be associated with anxiety? No    Have you had a significant life event? No     Are you feeling depressed? No    Do you have any concerns with your use of alcohol or other drugs? No  Follow-up on anxiety depression.  She has been on sertraline for 4 weeks and anxiety, anxiety attacks are quite unchanged.  She does not feel ready to return to work in couple weeks.  She did take buspar and it causes tremors more anxiety so she stopped.  And feels that anxiety is still through the roof.  Another friend referred her to Chinese acupuncturist would like to try that for mental health does need a referral.  Regarding medication for mental health we discussed change of SSRI, given her GI issues we talked about Lexapro.  She wants to try Celexa because her friend suggested that.    She started taking antibiotic for H. pylori, wondering if she is getting too much antibiotic.  Epigastric pain is improved.  Appetite is still very low.  She declined a virtual visit with GI because she wanted to be seen in person.    Social History     Tobacco Use     Smoking status: Never Smoker     Smokeless tobacco: Never Used   Substance Use Topics     Alcohol use: Never     Frequency: Never     Binge frequency: Never     Drug use: Never     AMANDA-7 SCORE 1/19/2021 2/1/2021 2/9/2021   Total Score - 13 (moderate anxiety) -   Total Score 14 13 15     PHQ 1/11/2021 2/1/2021 2/9/2021   PHQ-9 Total Score 14 11 14   Q9: Thoughts of better off dead/self-harm past 2 weeks Not at all Not at all Not at all           How many servings of fruits and vegetables do you eat daily?  2-3    On average, how many sweetened beverages do you drink each day (Examples: soda, juice, sweet tea, etc.  Do NOT count diet or artificially sweetened beverages)?   0    How many days per week  "do you exercise enough to make your heart beat faster? 3 or less    How many minutes a day do you exercise enough to make your heart beat faster? 30 - 60    How many days per week do you miss taking your medication? 0      Wt Readings from Last 5 Encounters:   02/16/21 76.7 kg (169 lb)   02/09/21 78 kg (172 lb)   01/11/21 80.7 kg (178 lb)   01/08/21 83.9 kg (185 lb)   01/07/21 86.2 kg (190 lb)         Review of Systems         Objective    /72   Pulse 78   Temp 96.3  F (35.7  C) (Tympanic)   Resp 16   Ht 1.689 m (5' 6.5\")   Wt 76.7 kg (169 lb)   LMP 01/23/2021   SpO2 98%   BMI 26.87 kg/m    Body mass index is 26.87 kg/m .  Physical Exam   GENERAL: healthy, alert and no distress  RESP: lungs clear to auscultation - no rales, rhonchi or wheezes  CV: regular rate and rhythm, normal S1 S2, no S3 or S4, no murmur, click or rub, no peripheral edema and peripheral pulses strong  ABDOMEN: soft, nontender, without hepatosplenomegaly or masses  PSYCH: mentation appears normal, affect normal/bright                "

## 2021-02-17 ENCOUNTER — HOSPITAL ENCOUNTER (EMERGENCY)
Facility: CLINIC | Age: 46
Discharge: HOME | End: 2021-02-17
Attending: EMERGENCY MEDICINE | Admitting: EMERGENCY MEDICINE
Payer: COMMERCIAL

## 2021-02-17 ENCOUNTER — MYC MEDICAL ADVICE (OUTPATIENT)
Dept: FAMILY MEDICINE | Facility: CLINIC | Age: 46
End: 2021-02-17

## 2021-02-17 VITALS
OXYGEN SATURATION: 100 % | BODY MASS INDEX: 25.01 KG/M2 | TEMPERATURE: 98.9 F | RESPIRATION RATE: 24 BRPM | WEIGHT: 165 LBS | HEART RATE: 63 BPM | DIASTOLIC BLOOD PRESSURE: 65 MMHG | SYSTOLIC BLOOD PRESSURE: 124 MMHG | HEIGHT: 68 IN

## 2021-02-17 DIAGNOSIS — A04.8 H. PYLORI INFECTION: ICD-10-CM

## 2021-02-17 DIAGNOSIS — R51.9 PRESSURE IN HEAD: ICD-10-CM

## 2021-02-17 DIAGNOSIS — R00.2 PALPITATIONS: ICD-10-CM

## 2021-02-17 LAB
ALBUMIN SERPL-MCNC: 3.6 G/DL (ref 3.4–5)
ALP SERPL-CCNC: 63 U/L (ref 40–150)
ALT SERPL W P-5'-P-CCNC: 21 U/L (ref 0–50)
ANION GAP SERPL CALCULATED.3IONS-SCNC: 3 MMOL/L (ref 3–14)
AST SERPL W P-5'-P-CCNC: 15 U/L (ref 0–45)
BASOPHILS # BLD AUTO: 0.1 10E9/L (ref 0–0.2)
BASOPHILS NFR BLD AUTO: 1.2 %
BILIRUB SERPL-MCNC: 0.3 MG/DL (ref 0.2–1.3)
BUN SERPL-MCNC: 6 MG/DL (ref 7–30)
CALCIUM SERPL-MCNC: 8.9 MG/DL (ref 8.5–10.1)
CHLORIDE SERPL-SCNC: 103 MMOL/L (ref 94–109)
CO2 SERPL-SCNC: 30 MMOL/L (ref 20–32)
CREAT SERPL-MCNC: 0.71 MG/DL (ref 0.52–1.04)
DIFFERENTIAL METHOD BLD: NORMAL
EOSINOPHIL # BLD AUTO: 0.1 10E9/L (ref 0–0.7)
EOSINOPHIL NFR BLD AUTO: 1.7 %
ERYTHROCYTE [DISTWIDTH] IN BLOOD BY AUTOMATED COUNT: 13.1 % (ref 10–15)
GFR SERPL CREATININE-BSD FRML MDRD: >90 ML/MIN/{1.73_M2}
GLUCOSE SERPL-MCNC: 108 MG/DL (ref 70–99)
HCG SERPL QL: NEGATIVE
HCT VFR BLD AUTO: 37 % (ref 35–47)
HGB BLD-MCNC: 12.1 G/DL (ref 11.7–15.7)
IMM GRANULOCYTES # BLD: 0 10E9/L (ref 0–0.4)
IMM GRANULOCYTES NFR BLD: 0.2 %
INTERPRETATION ECG - MUSE: NORMAL
LIPASE SERPL-CCNC: 244 U/L (ref 73–393)
LYMPHOCYTES # BLD AUTO: 2 10E9/L (ref 0.8–5.3)
LYMPHOCYTES NFR BLD AUTO: 33.8 %
MCH RBC QN AUTO: 29.4 PG (ref 26.5–33)
MCHC RBC AUTO-ENTMCNC: 32.7 G/DL (ref 31.5–36.5)
MCV RBC AUTO: 90 FL (ref 78–100)
MONOCYTES # BLD AUTO: 0.5 10E9/L (ref 0–1.3)
MONOCYTES NFR BLD AUTO: 8.8 %
NEUTROPHILS # BLD AUTO: 3.3 10E9/L (ref 1.6–8.3)
NEUTROPHILS NFR BLD AUTO: 54.3 %
NRBC # BLD AUTO: 0 10*3/UL
NRBC BLD AUTO-RTO: 0 /100
PLATELET # BLD AUTO: 200 10E9/L (ref 150–450)
POTASSIUM SERPL-SCNC: 3.4 MMOL/L (ref 3.4–5.3)
PROT SERPL-MCNC: 7.2 G/DL (ref 6.8–8.8)
RBC # BLD AUTO: 4.12 10E12/L (ref 3.8–5.2)
SODIUM SERPL-SCNC: 136 MMOL/L (ref 133–144)
TROPONIN I SERPL-MCNC: <0.015 UG/L (ref 0–0.04)
TSH SERPL DL<=0.005 MIU/L-ACNC: 3.06 MU/L (ref 0.4–4)
WBC # BLD AUTO: 6 10E9/L (ref 4–11)

## 2021-02-17 PROCEDURE — 84703 CHORIONIC GONADOTROPIN ASSAY: CPT | Performed by: EMERGENCY MEDICINE

## 2021-02-17 PROCEDURE — 83690 ASSAY OF LIPASE: CPT | Performed by: EMERGENCY MEDICINE

## 2021-02-17 PROCEDURE — 84443 ASSAY THYROID STIM HORMONE: CPT | Performed by: EMERGENCY MEDICINE

## 2021-02-17 PROCEDURE — 93005 ELECTROCARDIOGRAM TRACING: CPT

## 2021-02-17 PROCEDURE — 85025 COMPLETE CBC W/AUTO DIFF WBC: CPT | Performed by: EMERGENCY MEDICINE

## 2021-02-17 PROCEDURE — 84484 ASSAY OF TROPONIN QUANT: CPT | Performed by: EMERGENCY MEDICINE

## 2021-02-17 PROCEDURE — 80053 COMPREHEN METABOLIC PANEL: CPT | Performed by: EMERGENCY MEDICINE

## 2021-02-17 PROCEDURE — 99284 EMERGENCY DEPT VISIT MOD MDM: CPT

## 2021-02-17 ASSESSMENT — ENCOUNTER SYMPTOMS
LIGHT-HEADEDNESS: 1
FEVER: 0
NUMBNESS: 1
COUGH: 0
BLOOD IN STOOL: 0
PALPITATIONS: 1
DIZZINESS: 1
SHORTNESS OF BREATH: 0

## 2021-02-17 ASSESSMENT — MIFFLIN-ST. JEOR: SCORE: 1444

## 2021-02-17 NOTE — ED TRIAGE NOTES
Patient in with complaints of palpitations. States not sleeping well. Has H. Pylori and wants to be checked for an ulcer. States head feels swollen.

## 2021-02-17 NOTE — ED PROVIDER NOTES
"  History   Chief Complaint  Palpitations    HPI  Anabel Moon is a 45 year old female with a history of anxiety and depression, currently on antibiotics for H. Pylori, who presents for evaluation of palpitations. The patient reports that she has been experiencing these palpitations for the past few months now; however, they are significantly worse today. She notes some new head pressure that started three days ago as well. This pressure makes the room spin and feel as though she is going to pass out. She reports some numbness and tingling in her feet as well intermittently for some time.  She denies any black or bloody stools, chest pain, shortness of breath, fevers, or cough.     Review of Systems   Constitutional: Negative for fever.   Respiratory: Negative for cough and shortness of breath.    Cardiovascular: Positive for palpitations. Negative for chest pain.   Gastrointestinal: Negative for blood in stool.   Neurological: Positive for dizziness, light-headedness and numbness (legs).        Head pressure   All other systems reviewed and are negative.    Allergies  The patient has no known allergies.     Medications  Amoxil  Celexa  Biaxin  Protonix    Past Medical History  Depression  Thyroid disease  Anxiety  Heart burn  H. pylori    Family History  Anxiety    Social History  Presents to the ED with a family member.  Negative for tobacco use.  Negative for alcohol use.    Physical Exam     Patient Vitals for the past 24 hrs:   BP Temp Temp src Pulse Resp SpO2 Height Weight   02/17/21 1830 -- -- -- 63 24 100 % -- --   02/17/21 1800 -- -- -- 64 24 100 % -- --   02/17/21 1730 124/65 -- -- 69 25 100 % -- --   02/17/21 1514 119/62 98.9  F (37.2  C) Temporal 63 16 100 % 1.715 m (5' 7.5\") 74.8 kg (165 lb)     Physical Exam  Nursing note and vitals reviewed.  Constitutional:  Oriented to person, place, and time. Cooperative.   HENT:   Nose:    Nose normal.   Mouth/Throat:   Face mask is covering. Mucous membranes are " normal.   Eyes:    Conjunctivae normal and EOM are normal.      Pupils are equal, round, and reactive to light.   Neck:    Trachea normal.   Cardiovascular:  Normal rate, regular rhythm, normal heart sounds and normal pulses. No murmur heard.  Pulmonary/Chest:  Effort normal and breath sounds normal.   Abdominal:   Soft. Normal appearance and bowel sounds are normal.      There is no tenderness.      There is no rebound and no CVA tenderness.   Musculoskeletal:  Extremities atraumatic x 4.   Lymphadenopathy:  No cervical adenopathy.   Neurological:   Alert and oriented to person, place, and time. Normal strength.      No cranial nerve deficit or sensory deficit. GCS eye subscore is 4. GCS verbal subscore is 5. GCS motor subscore is 6.   Skin:    Skin is intact. No rash noted.   Psychiatric:   Normal mood and affect.    Emergency Department Course   ECG:   ECG taken at 1518, ECG read at 1725  Normal sinus rhythm with sinus arrhythmia. Septal infarct, age undetermined. Septal infract, age undetermined. Abnormal ECG.  No significant change as compared to prior, dated 1/8/21.   Rate 63 bpm. TN interval 132 ms. QRS duration 102 ms. QT/QTc 422/431 ms. P-R-T axes 69 67 38.    Laboratory:  CBC: WBC: 6.0, HGB: 12.1, PLT: 200  CMP: Glucose 108 (H), BUN 6 (L), o/w WNL (Creatinine: 0.71)  Lipase: 244  1745 Troponin I: <0.015  TSH with free T4 reflex: 3.06  HCG qualitative blood: negative     Emergency Department Course:  Reviewed:  I reviewed nursing notes, vitals and past medical history    Assessments:  1726 I physically examined the patient as documented above.    1846 I rechecked the patient and updated her on the findings of her workup.     Disposition:  The patient was discharged to home.     Impression & Plan   Medical Decision Making:  This is a 45-year-old female who came in with palpitations as her main complaint.  However she also had what she describes as head pressure and some dizziness.  Despite the fact that she  also claimed to have numbness and tingling in her feet, she had normal sensation on exam.  In fact, she has a very normal exam here.  She admits that she has some anxiety, which I think is contributing to all of her symptoms.  She also appears to be on something similar to a Prevpac for the H. pylori infection, and that could be contributing to some of her symptoms and specifically the headache.  I felt it was reasonable to proceed with the above work-up including EKG and blood work, and her blood work is all unremarkable.  She is not anemic, making a bleeding ulcer much less likely as well.  Her thyroid function is normal, as is the rest of her work-up.  When I went to relay all of the laboratory results to her, she then mentioned that she is having a lot of trouble sleeping and she was wondering if she could speak with a psychiatrist.  I explained to her that that is not something that is possible right now, as she does not need emergent psychiatric evaluation.  I recommend that she discuss all of this with her primary care provider, who she actually saw yesterday.  Therefore at this point I feel that she is safe for discharge and outpatient management.    Diagnosis:    ICD-10-CM    1. Palpitations  R00.2    2. Pressure in head  R51.9    3. H. pylori infection  A04.8      Scribe Disclosure:  I, Rob Sales, am serving as a scribe at 5:19 PM on 2/17/2021 to document services personally performed by Robert Christian MD based on my observations and the provider's statements to me.      Robert Christian MD  02/17/21 [id number]

## 2021-02-17 NOTE — TELEPHONE ENCOUNTER
Patient called to f/u on MyChart  Wants to be seen and have vitals checked    Next 5 appointments (look out 90 days)    Feb 17, 2021  4:40 PM  [facility] [address] [facility] [address]  [phone]   Feb 23, 2021 10:40 AM  Office Visit [facility]  [facility] [address] [facility] [id number] [phone]        Marjorie ALONZO RN

## 2021-02-22 ENCOUNTER — MYC MEDICAL ADVICE (OUTPATIENT)
Dept: FAMILY MEDICINE | Facility: CLINIC | Age: 46
End: 2021-02-22

## 2021-02-22 ENCOUNTER — TELEPHONE (OUTPATIENT)
Dept: FAMILY MEDICINE | Facility: CLINIC | Age: 46
End: 2021-02-22

## 2021-02-22 NOTE — TELEPHONE ENCOUNTER
Pt calling for Gastro referral number as she has not heard back yet.  No number on the referral but gave her number to call to get scheduled [phone].  Asked her to call back if any questions.  Dominga Strong RN

## 2021-02-23 ENCOUNTER — TELEPHONE (OUTPATIENT)
Dept: FAMILY MEDICINE | Facility: CLINIC | Age: 46
End: 2021-02-23

## 2021-02-23 ENCOUNTER — OFFICE VISIT (OUTPATIENT)
Dept: FAMILY MEDICINE | Facility: CLINIC | Age: 46
End: 2021-02-23
Payer: COMMERCIAL

## 2021-02-23 VITALS
SYSTOLIC BLOOD PRESSURE: 128 MMHG | TEMPERATURE: 98.6 F | DIASTOLIC BLOOD PRESSURE: 83 MMHG | HEIGHT: 68 IN | WEIGHT: 168 LBS | OXYGEN SATURATION: 100 % | BODY MASS INDEX: 25.46 KG/M2

## 2021-02-23 DIAGNOSIS — F32.1 MODERATE MAJOR DEPRESSION (H): ICD-10-CM

## 2021-02-23 DIAGNOSIS — R63.4 WEIGHT LOSS: ICD-10-CM

## 2021-02-23 DIAGNOSIS — F41.9 ANXIETY: ICD-10-CM

## 2021-02-23 DIAGNOSIS — F41.0 ANXIETY ATTACK: Primary | ICD-10-CM

## 2021-02-23 DIAGNOSIS — K21.00 GASTROESOPHAGEAL REFLUX DISEASE WITH ESOPHAGITIS WITHOUT HEMORRHAGE: ICD-10-CM

## 2021-02-23 PROCEDURE — 99214 OFFICE O/P EST MOD 30 MIN: CPT | Performed by: FAMILY MEDICINE

## 2021-02-23 PROCEDURE — 96127 BRIEF EMOTIONAL/BEHAV ASSMT: CPT | Performed by: FAMILY MEDICINE

## 2021-02-23 RX ORDER — SUCRALFATE ORAL 1 G/10ML
1 SUSPENSION ORAL 4 TIMES DAILY
Qty: 420 ML | Refills: 3 | Status: SHIPPED | OUTPATIENT
Start: 2021-02-23 | End: 2021-03-09

## 2021-02-23 RX ORDER — PROPRANOLOL HYDROCHLORIDE 40 MG/1
40 TABLET ORAL 2 TIMES DAILY PRN
Qty: 60 TABLET | Refills: 3 | Status: SHIPPED | OUTPATIENT
Start: 2021-02-23 | End: 2021-03-17

## 2021-02-23 RX ORDER — ATENOLOL 25 MG/1
25 TABLET ORAL 2 TIMES DAILY
COMMUNITY
Start: 2021-02-23 | End: 2021-02-23

## 2021-02-23 RX ORDER — ESCITALOPRAM OXALATE 10 MG/1
10 TABLET ORAL DAILY
Qty: 30 TABLET | Refills: 3 | Status: SHIPPED | OUTPATIENT
Start: 2021-02-23 | End: 2021-03-09

## 2021-02-23 ASSESSMENT — ANXIETY QUESTIONNAIRES
2. NOT BEING ABLE TO STOP OR CONTROL WORRYING: NEARLY EVERY DAY
GAD7 TOTAL SCORE: 15
4. TROUBLE RELAXING: NEARLY EVERY DAY
1. FEELING NERVOUS, ANXIOUS, OR ON EDGE: NEARLY EVERY DAY
7. FEELING AFRAID AS IF SOMETHING AWFUL MIGHT HAPPEN: MORE THAN HALF THE DAYS
GAD7 TOTAL SCORE: 15
3. WORRYING TOO MUCH ABOUT DIFFERENT THINGS: NEARLY EVERY DAY
5. BEING SO RESTLESS THAT IT IS HARD TO SIT STILL: NOT AT ALL
7. FEELING AFRAID AS IF SOMETHING AWFUL MIGHT HAPPEN: MORE THAN HALF THE DAYS
6. BECOMING EASILY ANNOYED OR IRRITABLE: SEVERAL DAYS
GAD7 TOTAL SCORE: 15

## 2021-02-23 ASSESSMENT — PATIENT HEALTH QUESTIONNAIRE - PHQ9
SUM OF ALL RESPONSES TO PHQ QUESTIONS 1-9: 7
10. IF YOU CHECKED OFF ANY PROBLEMS, HOW DIFFICULT HAVE THESE PROBLEMS MADE IT FOR YOU TO DO YOUR WORK, TAKE CARE OF THINGS AT HOME, OR GET ALONG WITH OTHER PEOPLE: SOMEWHAT DIFFICULT
SUM OF ALL RESPONSES TO PHQ QUESTIONS 1-9: 7

## 2021-02-23 ASSESSMENT — MIFFLIN-ST. JEOR: SCORE: 1457.6

## 2021-02-23 NOTE — PATIENT INSTRUCTIONS
Anxiety  - escitalopram (LEXAPRO) 10 MG tablet; Take 1 tablet (10 mg) by mouth daily  Dispense: 30 tablet; Refill: 3     Moderate major depression (H)  - escitalopram (LEXAPRO) 10 MG tablet; Take 1 tablet (10 mg) by mouth daily  Dispense: 30 tablet; Refill: 3    Call to schedule your imaging: ultrasound of liver and gall bladder   [place] or [facility] [phone] or [facility] [phone]        1. Anxiety attack  Take as needed  , ok to take up to twice daily if helps    - propranolol (INDERAL) 40 MG tablet; Take 1 tablet (40 mg) by mouth 2 times daily as needed (anxiety attacks)  Dispense: 60 tablet; Refill: 3    2. Gastroesophageal reflux disease with esophagitis without hemorrhage  - US Abdomen Limited; Future  - GASTROENTEROLOGY ADULT REF PROCEDURE ONLY; Future     continue with proton pump inhibitors protonix once daily   Consider endoscopy for heart burn, vomiting, weight loss.  [facility]: [phone] and [facility]: [phone]

## 2021-02-23 NOTE — TELEPHONE ENCOUNTER
A.S,   [facility] calling to clarify new Rx  You sent Rx for Propranolol   Do you know pt is already on Atenolol 25mg BID - from [facility] for blood pressure?  This was not on med list - added  Please advise  Thanks,  Marjorie ALONZO RN

## 2021-02-23 NOTE — TELEPHONE ENCOUNTER
I am aware -Tenormin was discussed and patient had stopped on her own after 2 doses and I have removed it from medications list at [facility] today.  Yes propranolol prescribed today.  Thanks

## 2021-02-23 NOTE — PROGRESS NOTES
Assessment & Plan     Anxiety attack  - add propranolol (INDERAL) 40 MG tablet  As needed    Dispense: 60 tablet; Refill: 3  Potential medication side effects were discussed with the patient; let me know if any occur.   Anxiety, treatment resistance  switched celexa to lexapro 10 mg once daily to help with anxity and to avoid possible - for Gastroenterology symptoms   Follow up in 2 weeks.   FMLA extended till 3/23/21 to allow 4 weeks for selective serotonin reuptake inhibitor .    - escitalopram (LEXAPRO) 10 MG tablet  Dispense: 30 tablet; Refill: 3  -we have discussed therapy and psychiatrist consultation. She has declined. She did see them at [facility] and did not ;like neurodin that was prescribed and made her more anxious  Her disabling mental health is not controlled and she is utilizing family support, short term leave of absence, because a lot of anxiety is work related  Moderate major depression (H)  - escitalopram (LEXAPRO) 10 MG tablet  Dispense: 30 tablet; Refill: 3    Gastroesophageal reflux disease with esophagitis without hemorrhage  - US Abdomen Limited- shows fatty liver. Patient is reassured  - GASTROENTEROLOGY ADULT REF PROCEDURE ONLY- proceed with Gastroenterology consultation to consider endoscopy.  For now continue with proton pump inhibitors and add   - sucralfate (CARAFATE) 1 GM/10ML suspension  Dispense: 420 mL; Refill: 3  - GASTROENTEROLOGY ADULT REF CONSULT ONLY    Weight loss, abnormal  -32 lbs in past 3 months  Poor appetite  Need for further imaging after Gastroenterology consultation   GASTROENTEROLOGY ADULT REF CONSULT ONLY        30 minutes spent on the date of the encounter doing chart review, history and exam, documentation and further activities as noted above      Return in about 2 weeks (around 3/9/2021) for concerns,unresolved.    Angelica Mendez MD  [facility]    Briana avina is a 45 year old who presents for the following health issues     HPI        Anxiety Follow-Up    How are you doing with your anxiety since your last visit? No change    Are you having other symptoms that might be associated with anxiety? No    Have you had a significant life event? No     Are you feeling depressed? No    Do you have any concerns with your use of alcohol or other drugs? No    Social History     Tobacco Use     Smoking status: Never Smoker     Smokeless tobacco: Never Used   Substance Use Topics     Alcohol use: Never     Frequency: Never     Binge frequency: Never     Drug use: Never     AMANDA-7 SCORE 2/1/2021 2/9/2021 2/23/2021   Total Score 13 (moderate anxiety) - 15 (severe anxiety)   Total Score 13 15 15     PHQ 2/1/2021 2/9/2021 2/23/2021   PHQ-9 Total Score 11 14 7   Q9: Thoughts of better off dead/self-harm past 2 weeks Not at all Not at all Not at all           How many servings of fruits and vegetables do you eat daily?  0-1    On average, how many sweetened beverages do you drink each day (Examples: soda, juice, sweet tea, etc.  Do NOT count diet or artificially sweetened beverages)?   0    How many days per week do you exercise enough to make your heart beat faster? 3 or less    How many minutes a day do you exercise enough to make your heart beat faster? 30 - 60    How many days per week do you miss taking your medication? 0  Previously tried sertraline- and buspar both caused heart palpitations     Saw thyroid specialist, beginning of february  and was not diagnosed with hypothyroidism but given tenormin- took once and felt further chest heaviness    Review of Systems chest is on fire always in am &  post meals.   Oatmeal with almond milk today- its not as abdomen today but previously used to eat it with keofer and that would cause worsening of heart burn.  yesterday almost went to emergency department-because of heart burn. Lasted for 8-9 hours after drinking water.  Today last doses of antibiotic for Helicobacter Pylori   Has been taking Protonix as  "well.  Appetite is poor. Only able to keep water down. Tries to eat oat meal and certain fruits. Was seen in emergency department on 2/17- symptoms improved on own .  She reports she was over 200lbs 3 months ago and has lots all that weight due to anxiety/ inability to keep food down  Wt Readings from Last 5 Encounters:   02/23/21 76.2 kg (168 lb)   02/17/21 74.8 kg (165 lb)   02/16/21 76.7 kg (169 lb)   02/09/21 78 kg (172 lb)   01/11/21 80.7 kg (178 lb)           Objective    /83 (BP Location: Left arm, Cuff Size: Adult Regular)   Temp 98.6  F (37  C) (Tympanic)   Ht 1.715 m (5' 7.5\")   Wt 76.2 kg (168 lb)   LMP 01/25/2021   SpO2 100%   BMI 25.92 kg/m    Body mass index is 25.92 kg/m .  Physical Exam   GENERAL: healthy, alert and no distress  NECK: no adenopathy, no asymmetry, masses, or scars and thyroid normal to palpation  RESP: lungs clear to auscultation - no rales, rhonchi or wheezes  CV: regular rate and rhythm, normal S1 S2, no S3 or S4, no murmur, click or rub, no peripheral edema and peripheral pulses strong  ABDOMEN: soft, nontender, no hepatosplenomegaly, no masses and bowel sounds normal  MS: no gross musculoskeletal defects noted, no edema  PSYCH: mentation appears normal, affect normal/bright        Answers for HPI/ROS submitted by the patient on 2/23/2021   If you checked off any problems, how difficult have these problems made it for you to do your work, take care of things at home, or get along with other people?: Somewhat difficult  PHQ9 TOTAL SCORE: 7  AMANDA 7 TOTAL SCORE: 15    "

## 2021-02-23 NOTE — NURSING NOTE
"Chief Complaint   Patient presents with     Anxiety     initial /83 (BP Location: Left arm, Cuff Size: Adult Regular)   Temp 98.6  F (37  C) (Tympanic)   Ht 1.715 m (5' 7.5\")   Wt 76.2 kg (168 lb)   LMP 01/25/2021   SpO2 100%   BMI 25.92 kg/m   Estimated body mass index is 25.92 kg/m  as calculated from the following:    Height as of this encounter: 1.715 m (5' 7.5\").    Weight as of this encounter: 76.2 kg (168 lb).  BP completed using cuff size: regular.  L   arm      Health Maintenance that is potentially due pending provider review:  NONE    n/a    Harpreet Barbour ma  "

## 2021-02-24 ASSESSMENT — ANXIETY QUESTIONNAIRES: GAD7 TOTAL SCORE: 15

## 2021-02-25 ENCOUNTER — MYC MEDICAL ADVICE (OUTPATIENT)
Dept: FAMILY MEDICINE | Facility: CLINIC | Age: 46
End: 2021-02-25

## 2021-02-25 ENCOUNTER — ANCILLARY PROCEDURE (OUTPATIENT)
Dept: ULTRASOUND IMAGING | Facility: CLINIC | Age: 46
End: 2021-02-25
Attending: FAMILY MEDICINE
Payer: COMMERCIAL

## 2021-02-25 DIAGNOSIS — K21.00 GASTROESOPHAGEAL REFLUX DISEASE WITH ESOPHAGITIS WITHOUT HEMORRHAGE: ICD-10-CM

## 2021-02-25 PROCEDURE — 76705 ECHO EXAM OF ABDOMEN: CPT | Performed by: RADIOLOGY

## 2021-02-25 NOTE — RESULT ENCOUNTER NOTE
Impression: Mildly increased hepatic echogenicity, suggestive of mild hepatic steatosis. Means fatty liver. No cancer. No gall bladder  pathology.Otherwise, unremarkable right upper quadrant ultrasound. It does not explain all your symptoms

## 2021-03-02 DIAGNOSIS — Z11.59 ENCOUNTER FOR SCREENING FOR OTHER VIRAL DISEASES: ICD-10-CM

## 2021-03-05 ENCOUNTER — MYC MEDICAL ADVICE (OUTPATIENT)
Dept: FAMILY MEDICINE | Facility: CLINIC | Age: 46
End: 2021-03-05

## 2021-03-05 NOTE — LETTER
March 8, 2021      Anabel Moon  [address]   [facility] [address]              To Whom It May Concern:          Sincerely,        Angelica Mendez MD

## 2021-03-09 ENCOUNTER — VIRTUAL VISIT (OUTPATIENT)
Dept: FAMILY MEDICINE | Facility: CLINIC | Age: 46
End: 2021-03-09
Payer: COMMERCIAL

## 2021-03-09 DIAGNOSIS — Z86.19 HISTORY OF HELICOBACTER PYLORI INFECTION: ICD-10-CM

## 2021-03-09 DIAGNOSIS — F43.23 ADJUSTMENT REACTION WITH ANXIETY AND DEPRESSION: ICD-10-CM

## 2021-03-09 DIAGNOSIS — F41.0 ANXIETY ATTACK: Primary | ICD-10-CM

## 2021-03-09 DIAGNOSIS — K21.00 GASTROESOPHAGEAL REFLUX DISEASE WITH ESOPHAGITIS WITHOUT HEMORRHAGE: ICD-10-CM

## 2021-03-09 PROCEDURE — 99214 OFFICE O/P EST MOD 30 MIN: CPT | Mod: GT | Performed by: FAMILY MEDICINE

## 2021-03-09 PROCEDURE — 96127 BRIEF EMOTIONAL/BEHAV ASSMT: CPT | Performed by: FAMILY MEDICINE

## 2021-03-09 ASSESSMENT — PATIENT HEALTH QUESTIONNAIRE - PHQ9
5. POOR APPETITE OR OVEREATING: SEVERAL DAYS
SUM OF ALL RESPONSES TO PHQ QUESTIONS 1-9: 2

## 2021-03-09 ASSESSMENT — ANXIETY QUESTIONNAIRES
1. FEELING NERVOUS, ANXIOUS, OR ON EDGE: SEVERAL DAYS
2. NOT BEING ABLE TO STOP OR CONTROL WORRYING: NOT AT ALL
7. FEELING AFRAID AS IF SOMETHING AWFUL MIGHT HAPPEN: SEVERAL DAYS
GAD7 TOTAL SCORE: 3
6. BECOMING EASILY ANNOYED OR IRRITABLE: NOT AT ALL
3. WORRYING TOO MUCH ABOUT DIFFERENT THINGS: NOT AT ALL
5. BEING SO RESTLESS THAT IT IS HARD TO SIT STILL: NOT AT ALL
IF YOU CHECKED OFF ANY PROBLEMS ON THIS QUESTIONNAIRE, HOW DIFFICULT HAVE THESE PROBLEMS MADE IT FOR YOU TO DO YOUR WORK, TAKE CARE OF THINGS AT HOME, OR GET ALONG WITH OTHER PEOPLE: NOT DIFFICULT AT ALL

## 2021-03-09 NOTE — NURSING NOTE
"Chief Complaint   Patient presents with     Letter Request   she cut the lexapro down because she was having headaches  initial There were no vitals taken for this visit. Estimated body mass index is 25.92 kg/m  as calculated from the following:    Height as of 2/23/21: 1.715 m (5' 7.5\").    Weight as of 2/23/21: 76.2 kg (168 lb).  BP completed using cuff size: .  L  R arm      Health Maintenance that is potentially due pending provider review:      Harpreet Barbour ma  "

## 2021-03-09 NOTE — LETTER
FOR: Work         RE: Anabel Moon  : 1975              Anabel  is seen on virtual visit 21 at our clinic.   She is able to return to work without any restrictions as of  21.             Sincerely,      Angelica Mendez MD

## 2021-03-09 NOTE — PROGRESS NOTES
kailyn is a 45 year old who is being evaluated via a billable video visit.      How would you like to obtain your AVS? MyChart  If the video visit is dropped, the invitation should be resent by: Text to cell phone: [phone]  Will anyone else be joining your video visit? No    Video Start Time: 2:05 PM    Assessment & Plan   45 year old female with 3 month history of anxiety/ anxiety attack & reactive depression , most likley related to gastroesophageal reflux disease.  Had basic cardiac work up- EKG negative. Tried selective serotonin reuptake inhibitor- celexa caused nausea and lexapro caused headache .  Propranolol helps with anxiety attacks- that are much less frequent now and   Proton pump inhibitors helped with gastroesophageal reflux disease.  Released to work unrestricted starting 03/09/21        Adjustment reaction with anxiety and depression  Coping well without any medications  Does not want to be on any long term medications and looking forward to return to work full time.  Anxiety attack  Ok to use propranolol as needed  And follow up as needed      Gastroesophageal reflux disease with esophagitis without hemorrhage  Complete 2 weeks antibiotic course for Helicobacter Pylori  & Has been on pantoprazole for 4 weeks & Reports acid reflux is resolved. I have advised to finish at least 8 weeks of proton pump inhibitors and then ok to stop.  Follow up as needed            Return in about 3 months (around 6/9/2021) for concerns,unresolved, virtual/video visit.    Angelica Mendez MD  [facility]   kailyn is a 45 year old who presents for the following health issues     HPI   Stopped lexapro- a week ago-because it  gave her migraine headache.  Headaches resolved once stopped lexapro.  Reports looking forward to return to work full time and needs a letter stating to be released to work.     Propranolol helps a lot with anxiety attack- uses only as needed only once a week  but in past 10 days has not used/ needed any  PHQ 2/9/2021 2/23/2021 3/9/2021   PHQ-9 Total Score 14 7 2   Q9: Thoughts of better off dead/self-harm past 2 weeks Not at all Not at all Not at all     AMANDA-7 SCORE 2/9/2021 2/23/2021 3/9/2021   Total Score - 15 (severe anxiety) -   Total Score 15 15 3       Has been on pantoprazole for 4 weeks & Reports acid reflux is resolved.        Review of Systems   Constitutional, HEENT, cardiovascular, pulmonary, GI, , musculoskeletal, neuro, skin, endocrine and psych systems are negative, except as otherwise noted.      Objective           Vitals:  No vitals were obtained today due to virtual visit.    Physical Exam   GENERAL: Healthy, alert and no distress  EYES: Eyes grossly normal to inspection.  No discharge or erythema, or obvious scleral/conjunctival abnormalities.  RESP: No audible wheeze, cough, or visible cyanosis.  No visible retractions or increased work of breathing.    SKIN: Visible skin clear. No significant rash, abnormal pigmentation or lesions.  NEURO: Cranial nerves grossly intact.  Mentation and speech appropriate for age.  PSYCH: Mentation appears normal, affect normal/bright, judgement and insight intact, normal speech and appearance well-groomed.                Video-Visit Details    Type of service:  Video Visit    Video End Time:2:16 PM    Originating Location (pt. Location): Home    Distant Location (provider location):  [facility]     Platform used for Video Visit: StudyRoom

## 2021-03-09 NOTE — LETTER
[facility]  [address]  [place] [facility] [address] [phone]          March 9, 2021    RE:  Anabel Moon                                                                                                                                                       [address]   [place] [address]            To whom it may concern:    Anabel Moon is under my professional care for Data Unavailable She  may return to work with the following: The employee is ABLE to return to work today.    When the patient returns to work, the following restrictions apply until NA:  A) Bend: {WC FREQUENCY:107683}  B) Squat: {WC FREQUENCY:512264}  C) Walk/Stand: {WC FREQUENCY:211796}  D) Reach Above Shoulders: {WC FREQUENCY:344662}  E) Lift, carry, push, and pull no more than:  {WC LBS:085345}{WORKABILITY LIST :935884} on or about ***.      Sincerely,        Angelica Mendez {PROVIDER CREDENTIALS:089128}

## 2021-03-10 ASSESSMENT — ANXIETY QUESTIONNAIRES: GAD7 TOTAL SCORE: 3

## 2021-03-17 ENCOUNTER — VIRTUAL VISIT (OUTPATIENT)
Dept: FAMILY MEDICINE | Facility: CLINIC | Age: 46
End: 2021-03-17
Payer: COMMERCIAL

## 2021-03-17 DIAGNOSIS — F41.9 ANXIETY: ICD-10-CM

## 2021-03-17 DIAGNOSIS — F41.1 GAD (GENERALIZED ANXIETY DISORDER): ICD-10-CM

## 2021-03-17 PROCEDURE — 99213 OFFICE O/P EST LOW 20 MIN: CPT | Mod: TEL | Performed by: FAMILY MEDICINE

## 2021-03-17 RX ORDER — BUSPIRONE HYDROCHLORIDE 10 MG/1
10 TABLET ORAL 2 TIMES DAILY
Qty: 60 TABLET | Refills: 1 | Status: SHIPPED | OUTPATIENT
Start: 2021-03-17 | End: 2021-04-15

## 2021-03-17 NOTE — PROGRESS NOTES
kailyn is a 45 year old who is being evaluated via a billable telephone visit.      What phone number would you like to be contacted at? [phone]  How would you like to obtain your AVS? MyChart    Assessment & Plan     Anxiety  Assessment: 45 year old female with recurring anxiety and willing to start buspar as never gave it much chance and stopped it within 1-2 dose.previously did not like sertraline .She is back at work.has an appointment next month for counseling in 2 week and looking forward  - busPIRone (BUSPAR) 10 MG tablet, take it twice daily   -  Dispense: 60 tablet; Refill: 1    Potential medication side effects were discussed with the patient; let me know if any occur.    Follow up in 4 weeks or earlier if questions with medications or mood       Return in about 4 weeks (around 4/14/2021) for concerns,unresolved, virtual/video visit.    Angelica Mendez MD  [facility]   kailyn is a 45 year old who presents for the following health issues   HPI     Anxiety Follow-Up    How are you doing with your anxiety since your last visit? Worsened - pt would like to go back on buspar     Are you having other symptoms that might be associated with anxiety? No    Have you had a significant life event? No     Are you feeling depressed? No    Do you have any concerns with your use of alcohol or other drugs? No  Sleep is fine but anxiety is coming back  Buspar helped a bit , and wanted to stop it on own as felt better  No depression and is very specific that sleep is much better- no concern. Back to work. Appetite is back to normal     took propranol twice without any benefit- not taking it any more.  Has stopped proton pump inhibitors as over all feeling well    Social History     Tobacco Use     Smoking status: Never Smoker     Smokeless tobacco: Never Used   Substance Use Topics     Alcohol use: Never     Frequency: Never     Binge frequency: Never     Drug use: Never     AMANDA-7  SCORE 2/9/2021 2/23/2021 3/9/2021   Total Score - 15 (severe anxiety) -   Total Score 15 15 3     PHQ 2/9/2021 2/23/2021 3/9/2021   PHQ-9 Total Score 14 7 2   Q9: Thoughts of better off dead/self-harm past 2 weeks Not at all Not at all Not at all     Last PHQ-9 3/9/2021   1.  Little interest or pleasure in doing things 1   2.  Feeling down, depressed, or hopeless 0   3.  Trouble falling or staying asleep, or sleeping too much 1   4.  Feeling tired or having little energy 0   5.  Poor appetite or overeating 0   6.  Feeling bad about yourself 0   7.  Trouble concentrating 0   8.  Moving slowly or restless 0   Q9: Thoughts of better off dead/self-harm past 2 weeks 0   PHQ-9 Total Score 2   Difficulty at work, home, or with people Not difficult at all     AMANDA-7  3/9/2021   1. Feeling nervous, anxious, or on edge 1   2. Not being able to stop or control worrying 0   3. Worrying too much about different things 0   4. Trouble relaxing 1   5. Being so restless that it is hard to sit still 0   6. Becoming easily annoyed or irritable 0   7. Feeling afraid, as if something awful might happen 1   AMANDA-7 Total Score 3   If you checked any problems, how difficult have they made it for you to do your work, take care of things at home, or get along with other people? Not difficult at all             Review of Systems   Constitutional, HEENT, cardiovascular, pulmonary, GI, , musculoskeletal, neuro, skin, endocrine and psych systems are negative, except as otherwise noted.      Objective           Vitals:  No vitals were obtained today due to virtual visit.    Physical Exam   healthy, alert and no distress  PSYCH: Alert and oriented times 3; coherent speech, normal   rate and volume, able to articulate logical thoughts, able   to abstract reason, no tangential thoughts, no hallucinations   or delusions  Her affect is normal  RESP: No cough, no audible wheezing, able to talk in full sentences  Remainder of exam unable to be completed  due to telephone visits                Phone call duration: 8  minutes

## 2021-03-17 NOTE — PATIENT INSTRUCTIONS
- busPIRone (BUSPAR) 10 MG tablet, take it twice daily   -  Dispense: 60 tablet; Refill: 1    Follow up in 4 weeks or earlier if questions with medications or mood

## 2021-03-23 NOTE — TELEPHONE ENCOUNTER
REFERRAL INFORMATION:    Referring Provider:  Dr. Angelica Mendez     Referring Clinic:  [facility]     Reason for Visit/Diagnosis: Abdominal pain, GERD or Reflux      FUTURE VISIT INFORMATION:    Appointment Date: 4/12/2021    Appointment Time: 9 AM      NOTES STATUS DETAILS   OFFICE NOTE from Referring Provider Internal 3/9/2021, 2/23/2021, 2/4/2021 Office visit with Dr. Mendez      OFFICE NOTE from Other Specialist Internal 1/7/2021 Office visit with Toby Mulligan PA-C ([facility])    HOSPITAL DISCHARGE SUMMARY/  ED VISITS N/A    OPERATIVE REPORT N/A    MEDICATION LIST Internal         ENDOSCOPY  N/A    COLONOSCOPY N/A    ERCP N/A    EUS N/A    STOOL TESTING Internal 2/4/2021   PERTINENT LABS Internal/ Care Everywhere    PATHOLOGY REPORTS (RELATED) N/A    IMAGING (CT, MRI, EGD, MRCP, Small Bowel Follow Through/SBT, MR/CT Enterography) Internal US Abdomen: 2/25/2021

## 2021-03-29 ENCOUNTER — MEDICAL CORRESPONDENCE (OUTPATIENT)
Dept: HEALTH INFORMATION MANAGEMENT | Facility: CLINIC | Age: 46
End: 2021-03-29

## 2021-03-29 DIAGNOSIS — Z11.59 ENCOUNTER FOR SCREENING FOR OTHER VIRAL DISEASES: ICD-10-CM

## 2021-03-29 LAB
SARS-COV-2 RNA RESP QL NAA+PROBE: NORMAL
SPECIMEN SOURCE: NORMAL

## 2021-03-29 PROCEDURE — U0005 INFEC AGEN DETEC AMPLI PROBE: HCPCS | Performed by: SPECIALIST

## 2021-03-29 PROCEDURE — U0003 INFECTIOUS AGENT DETECTION BY NUCLEIC ACID (DNA OR RNA); SEVERE ACUTE RESPIRATORY SYNDROME CORONAVIRUS 2 (SARS-COV-2) (CORONAVIRUS DISEASE [COVID-19]), AMPLIFIED PROBE TECHNIQUE, MAKING USE OF HIGH THROUGHPUT TECHNOLOGIES AS DESCRIBED BY CMS-2020-01-R: HCPCS | Performed by: SPECIALIST

## 2021-03-30 LAB
LABORATORY COMMENT REPORT: NORMAL
SARS-COV-2 RNA RESP QL NAA+PROBE: NEGATIVE
SPECIMEN SOURCE: NORMAL

## 2021-04-12 ENCOUNTER — VIRTUAL VISIT (OUTPATIENT)
Dept: GASTROENTEROLOGY | Facility: CLINIC | Age: 46
End: 2021-04-12
Attending: FAMILY MEDICINE
Payer: COMMERCIAL

## 2021-04-12 ENCOUNTER — PRE VISIT (OUTPATIENT)
Dept: GASTROENTEROLOGY | Facility: CLINIC | Age: 46
End: 2021-04-12

## 2021-04-12 VITALS — BODY MASS INDEX: 25.11 KG/M2 | HEIGHT: 67 IN | WEIGHT: 160 LBS

## 2021-04-12 DIAGNOSIS — R10.13 ABDOMINAL PAIN, EPIGASTRIC: Primary | ICD-10-CM

## 2021-04-12 DIAGNOSIS — R63.4 WEIGHT LOSS: ICD-10-CM

## 2021-04-12 PROCEDURE — 99204 OFFICE O/P NEW MOD 45 MIN: CPT | Mod: GT | Performed by: INTERNAL MEDICINE

## 2021-04-12 ASSESSMENT — PAIN SCALES - GENERAL: PAINLEVEL: MODERATE PAIN (5)

## 2021-04-12 ASSESSMENT — MIFFLIN-ST. JEOR: SCORE: 1413.39

## 2021-04-12 NOTE — LETTER
2021         RE: Anabel Moon  [address]  [place] [address]        Dear Colleague,    Thank you for referring your patient, Anabel Moon, to the [facility] [place]. Please see a copy of my visit note below.    Failed attempt at video visit -patient tech issues.     Anabel Moon is a 45 year old female who is being evaluated via a telephone visit.      The patient has been notified of following (by M.A) Kenneth     Total time of call between patient and provider was 24 minutes         Distant Location (provider location):  [facility] [place]         Gastroenterology Consult   ealth      Chief Complaint   Abd pain, Weight loss         ASSESSMENT AND RECOMMENDATIONS:   Assessment:  45 year old female with significant abdominal pain in upper abdomen at base of breat bone, worse with eating, was having significant acid burning radiating to her throat which since has stopped.  Has had a 30# unexplained weight loss since  Nov-Dec 2020.       Recommendations:  EGD (already scheduled)  CT Abdomen and Pelvis with oral and IV contrast.            History of Present Illness:     Anabel Moon is a 45 year old female with a history of pressure and pain through the lower part of her chest in between her breasts.     She was having burning coming up from chest into her throat.  Its gone away. She feels she has some kind of sore or open wound in her esophagus.  Feels like there is a pressure there all the time gets worse with eating.      Little bit short of breath.  Gets a little bit worse with eating.   It lasts for about an hour or so.    Doesn't move.  She has had this 5 months ago.  Aunt .  A lot of stress, with work.  Accounting.    Mild fatty liver.  US abd on 2021.      A little bit constipated, goes a little bit in the morning.  At times its hard to go.      Weight loss- since this began about 30 lbs.   Eating a  lot less from the pain.          NO Diabetes, heart disease, lung disease, kidney disease  Skin- no skin issues   Joins- no joint problems  No headaches   Had been treated for anxiety and panic by primary MD.    No OTC pain medications.      Medications:   Seeing a naturopath  - told she had candida, taking herbal medicine.     Objective Testing:  CBC in Fall showed leukopenia and thrombocytopenia ?? Viral illness.        Right upper quadrant US                                                Impression: Mildly increased hepatic echogenicity, suggestive of mild  hepatic steatosis. Otherwise, unremarkable right upper quadrant  ultrasound.    Helicobacter pylori stool ag positive - sp triple therapy in January.             Past Medical History:     Past Medical History:   Diagnosis Date     Current moderate episode of major depressive disorder without prior episode (H) 1/11/2021     Thyroid disease hypothyroidism            Past Surgical History:     No past surgical history on file.         Previous Endoscopy:   No results found for this or any previous visit.         Social History:     Social History     Socioeconomic History     Marital status: Single     Spouse name: None     Number of children: None     Years of education: None     Highest education level: None   Occupational History     None   Social Needs     Financial resource strain: None     Food insecurity     Worry: None     Inability: None     Transportation needs     Medical: None     Non-medical: None   Tobacco Use     Smoking status: Never Smoker     Smokeless tobacco: Never Used   Substance and Sexual Activity     Alcohol use: Never     Frequency: Never     Binge frequency: Never     Drug use: Never     Sexual activity: Not Currently     Partners: Male     Birth control/protection: None     Comment: i am experiencing extreme anxiety at the moment. 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            Family History:     Family History   Problem Relation Age of Onset     Anxiety Disorder Sister      Mental Illness Brother      No known history of colorectal cancer, liver disease, or inflammatory bowel disease.         Allergies:   Reviewed and edited as appropriate     Allergies   Allergen Reactions     No Known Allergies             Medications:     Current Outpatient Medications   Medication Sig Dispense Refill     busPIRone (BUSPAR) 10 MG tablet Take 1 tablet (10 mg) by mouth 2 times daily 60 tablet 1             Review of Systems:     A complete 10 point review of systems was performed and is negative except as noted in the HPI           Physical Exam:   Ht 1.702 m (5' 7\")   Wt 72.6 kg (160 lb)   BMI 25.06 kg/m    Wt:   Wt Readings from Last 2 Encounters:   04/12/21 72.6 kg (160 lb)   02/23/21 76.2 kg (168 lb)      Constitutional: cooperative, pleasant, not dyspneic , no acute distress            Data:         Anil Muse MD  Associate Professor of Medicine  Division of Gastroenterology, Hepatology, and Nutrition  [facility]          "

## 2021-04-12 NOTE — PATIENT INSTRUCTIONS
st.     It was a pleasure taking care of you today.  I've included a brief summary of our discussion and care plan from today's visit below.  Please review this information with your primary care provider.  _______________________________________________________________  Recommendations:  EGD (already scheduled)  CT Abdomen and Pelvis with oral and IV contrast.       To schedule your endoscopy procedure call [phone]    To schedule imaging please call [phone]    To schedule your lab appointments (at [facility] only) call [phone]        Please call our nurse Blank with any questions or concerns - [phone].    Return to GI Clinic in 3 months to review your progress.    _______________________________________________________________________    Who do I call with any questions after my visit?  Please be in touch if there are any further questions that arise following today's visit.  There are multiple ways to contact your gastroenterology care team.        During business hours, you may reach a Gastroenterology nurse at [phone] and choose option 3.         To schedule or reschedule an appointment, please call [phone].       You can always send a secure message through Acclaimd.  Acclaimd messages are answered by your nurse or doctor typically within 24 hours.  Please allow extra time on weekends and holidays.        For urgent/emergent questions after business hours, you may reach the on-call GI Fellow by contacting the [facility] at [phone].     How will I get the results of any tests ordered?    You will receive all of your results.  If you have signed up for Acclaimd, any tests ordered at your visit will be available to you after your physician reviews them.  Typically this takes 1-2 weeks.  If there are urgent results that require a change in your care plan, your physician or nurse will call you to discuss the next steps.      What is WegoWisehart?  Acclaimd is a secure  way for you to access all of your healthcare records from the [facility].  It is a web based computer program, so you can sign on to it from any location.  It also allows you to send secure messages to your care team.  I recommend signing up for Seromet[employer] access if you have not already done so and are comfortable with using a computer.      How to I schedule a follow-up visit?  If you did not schedule a follow-up visit today, please call [phone] to schedule a follow-up office visit.        Sincerely,    Anil Muse MD  [facility]  Division of Gastroenterology

## 2021-04-12 NOTE — PROGRESS NOTES
Failed attempt at video visit -patient tech issues.     Anabel Moon is a 45 year old female who is being evaluated via a telephone visit.      The patient has been notified of following (by M.A) Kenneth     Total time of call between patient and provider was 24 minutes         Distant Location (provider location):  [facility] [place]         Gastroenterology Consult   [facility]        Chief Complaint   Abd pain, Weight loss         ASSESSMENT AND RECOMMENDATIONS:   Assessment:  45 year old female with significant abdominal pain in upper abdomen at base of breat bone, worse with eating, was having significant acid burning radiating to her throat which since has stopped.  Has had a 30# unexplained weight loss since  Nov-Dec 2020.       Recommendations:  EGD (already scheduled)  CT Abdomen and Pelvis with oral and IV contrast.            History of Present Illness:     Anabel Moon is a 45 year old female with a history of pressure and pain through the lower part of her chest in between her breasts.     She was having burning coming up from chest into her throat.  Its gone away. She feels she has some kind of sore or open wound in her esophagus.  Feels like there is a pressure there all the time gets worse with eating.      Little bit short of breath.  Gets a little bit worse with eating.   It lasts for about an hour or so.    Doesn't move.  She has had this 5 months ago.  Aunt .  A lot of stress, with work.  Accounting.    Mild fatty liver.  US abd on 2021.      A little bit constipated, goes a little bit in the morning.  At times its hard to go.      Weight loss- since this began about 30 lbs.   Eating a lot less from the pain.          NO Diabetes, heart disease, lung disease, kidney disease  Skin- no skin issues   Joins- no joint problems  No headaches   Had been treated for anxiety and panic by primary MD.    No OTC pain medications.      Medications:   Seeing a  naturopath  - told she had candida, taking herbal medicine.     Objective Testing:  CBC in Fall showed leukopenia and thrombocytopenia ?? Viral illness.        Right upper quadrant US                                                Impression: Mildly increased hepatic echogenicity, suggestive of mild  hepatic steatosis. Otherwise, unremarkable right upper quadrant  ultrasound.    Helicobacter pylori stool ag positive - sp triple therapy in January.             Past Medical History:     Past Medical History:   Diagnosis Date     Current moderate episode of major depressive disorder without prior episode (H) 1/11/2021     Thyroid disease hypothyroidism            Past Surgical History:     No past surgical history on file.         Previous Endoscopy:   No results found for this or any previous visit.         Social History:     Social History     Socioeconomic History     Marital status: Single     Spouse name: None     Number of children: None     Years of education: None     Highest education level: None   Occupational History     None   Social Needs     Financial resource strain: None     Food insecurity     Worry: None     Inability: None     Transportation needs     Medical: None     Non-medical: None   Tobacco Use     Smoking status: Never Smoker     Smokeless tobacco: Never Used   Substance and Sexual Activity     Alcohol use: Never     Frequency: Never     Binge frequency: Never     Drug use: Never     Sexual activity: Not Currently     Partners: Male     Birth control/protection: None     Comment: i am experiencing extreme anxiety at the moment. not slept   Lifestyle     Physical activity     Days per week: None     Minutes per session: None     Stress: None   Relationships     Social connections     Talks on phone: None     Gets together: None     Attends Episcopal service: None     Active member of club or organization: None     Attends meetings of clubs or organizations: None     Relationship status: None  "    Intimate partner violence     Fear of current or ex partner: None     Emotionally abused: None     Physically abused: None     Forced sexual activity: None   Other Topics Concern     None   Social History Narrative    Doctor Fun manager.            Family History:     Family History   Problem Relation Age of Onset     Anxiety Disorder Sister      Mental Illness Brother      No known history of colorectal cancer, liver disease, or inflammatory bowel disease.         Allergies:   Reviewed and edited as appropriate     Allergies   Allergen Reactions     No Known Allergies             Medications:     Current Outpatient Medications   Medication Sig Dispense Refill     busPIRone (BUSPAR) 10 MG tablet Take 1 tablet (10 mg) by mouth 2 times daily 60 tablet 1             Review of Systems:     A complete 10 point review of systems was performed and is negative except as noted in the HPI           Physical Exam:   Ht 1.702 m (5' 7\")   Wt 72.6 kg (160 lb)   BMI 25.06 kg/m    Wt:   Wt Readings from Last 2 Encounters:   04/12/21 72.6 kg (160 lb)   02/23/21 76.2 kg (168 lb)      Constitutional: cooperative, pleasant, not dyspneic , no acute distress            Data:         Anil Muse MD  Associate Professor of Medicine  Division of Gastroenterology, Hepatology, and Nutrition  [facility]        "

## 2021-04-12 NOTE — NURSING NOTE
"Chief Complaint   Patient presents with     Consult     GERD and abdominal pain consult       Vitals:    04/12/21 0851   Weight: 72.6 kg (160 lb)   Height: 1.702 m (5' 7\")       Body mass index is 25.06 kg/m .    Juan Chinchilla, EMT    "

## 2021-04-13 ENCOUNTER — HOSPITAL ENCOUNTER (OUTPATIENT)
Dept: CT IMAGING | Facility: CLINIC | Age: 46
Discharge: HOME | End: 2021-04-13
Attending: INTERNAL MEDICINE | Admitting: INTERNAL MEDICINE
Payer: COMMERCIAL

## 2021-04-13 DIAGNOSIS — R10.13 ABDOMINAL PAIN, EPIGASTRIC: ICD-10-CM

## 2021-04-13 DIAGNOSIS — R63.4 WEIGHT LOSS: ICD-10-CM

## 2021-04-13 PROCEDURE — 250N000011 HC RX IP 250 OP 636: Performed by: INTERNAL MEDICINE

## 2021-04-13 PROCEDURE — 74177 CT ABD & PELVIS W/CONTRAST: CPT

## 2021-04-13 PROCEDURE — 250N000009 HC RX 250: Performed by: INTERNAL MEDICINE

## 2021-04-13 RX ORDER — IOPAMIDOL 755 MG/ML
78 INJECTION, SOLUTION INTRAVASCULAR ONCE
Status: COMPLETED | OUTPATIENT
Start: 2021-04-13 | End: 2021-04-13

## 2021-04-13 RX ADMIN — IOPAMIDOL 78 ML: 755 INJECTION, SOLUTION INTRAVENOUS at 17:55

## 2021-04-13 RX ADMIN — SODIUM CHLORIDE 63 ML: 9 INJECTION, SOLUTION INTRAVENOUS at 17:55

## 2021-04-14 NOTE — PROGRESS NOTES
Has a list of blood tests inclduig multiple IgA,E, M,G,E    Assessment & Plan     Dysuria  Assessment.  44yo female she made initial appointment with concerns about increased urination.  Urine analysis is negative.  - UA reflex to Microscopic and Culture    Vaginal discharge  She also has been concerned about vaginal discharge.  Wet prep is positive for rare clue cells of bacterial vaginosis.  Treatment options discussed and sent to sleep.  She is informed on phone.  - Wet prep  - metroNIDAZOLE (FLAGYL) 500 MG tablet; Take 1 tablet (500 mg) by mouth 2 times daily for 7 days  She reports she most likely will not start the medication    Abdomen Pain secondary to GERD.  She is scheduled EGD and has seen gastroenterologist recommendations and discuss CT scan as well  She has seen gastroenterologist on 412   CT  discussed with her no acute finding in her abdomen and pelvis.  She is advised to follow-up with gastroenterology.      Other additional significant CT scan finding and those should also be further discussed with the ordering provider    renal calculus, right  CT scan - It also showed 2 mm nonobstructing right renal calculus.  Urine analysis is clear.  She does not have right flank pain and has no CVA tenderness.  She is advised to improve hydration. Strain urine and follow up as needed      Adrenal nodule (H)  CT scan- It did show left adrenal indeterminant 1.5 cm nodule.   That should further be evaluated by CT or MRI.    Anxiety  AMANDA-7 SCORE 2/23/2021 3/9/2021 4/15/2021   Total Score 15 (severe anxiety) - 4 (minimal anxiety)   Total Score 15 3 4     Improved on - busPIRone (BUSPAR) 10 MG tablet; Take 1 tablet (10 mg) by mouth daily    Laboratory examination ordered as part of a routine general medical examination  Has been seeing [facility]/ SANDRA Oquendo,FACFN- funtional medicne specialist.  The requested following note of them have been done in recent past.  They are being  obtained again as per patient request and will be faxed to the provider.  - Vitamin D Deficiency  - **TSH with free T4 reflex FUTURE anytime  - T3, Free  - T4, free  - Thyroid peroxidase antibody  - Anti thyroglobulin antibody  - T3 uptake  - ESR: Erythrocyte sedimentation rate  - Lipid panel reflex to direct LDL Fasting  - Lactate Dehydrogenase  - Insulin level  - IgM  - IgG  - IgE  - IgA  - Homocysteine  - GGT  - Iron and iron binding capacity  - **Comprehensive metabolic panel FUTURE anytime  - **CBC with platelets differential FUTURE 2mo  - CRP, inflammation      35 minutes spent on the date of the encounter doing chart review, history and exam, documentation and further activities per the note      Return in about 2 weeks (around 4/29/2021) for concerns,unresolved.    Angelica Mendez MD  [facility]    Briana avina is a 45 year old who presents for the following health issues     HPI           Anxiety Follow-Up    How are you doing with your anxiety since your last visit? No change    Are you having other symptoms that might be associated with anxiety? No    Have you had a significant life event? OTHER:paternal aunty sudden death in dec18th, 2021    Are you feeling depressed? No    Do you have any concerns with your use of alcohol or other drugs? No     She reports she made an appointment to get blood tests completed as requested by naturopath functional medicine specialist.  She would like all labs to be completed today as she is fasting.  She initially reported increased urination without any bleeding no fever or flank pain.  Urinalysis is within normal limits except for ketones that is understandable because she is fasting.  She also complained of vaginal discharge is wondering if it the reason she has lower abdominal pain and sometimes low back  She recently saw her gastroenterologist for  Abdomen pain-  She is scheduled for EGD  Was given a CT   scan would like the CT scan  "report to be discussed with her.    Wt Readings from Last 5 Encounters:   04/15/21 73.9 kg (163 lb)   04/12/21 72.6 kg (160 lb)   02/23/21 76.2 kg (168 lb)   02/17/21 74.8 kg (165 lb)   02/16/21 76.7 kg (169 lb)       Social History     Tobacco Use     Smoking status: Never Smoker     Smokeless tobacco: Never Used   Substance Use Topics     Alcohol use: Never     Frequency: Never     Binge frequency: Never     Drug use: Never     AMANDA-7 SCORE 2/23/2021 3/9/2021 4/15/2021   Total Score 15 (severe anxiety) - 4 (minimal anxiety)   Total Score 15 3 4     PHQ 2/23/2021 3/9/2021 4/15/2021   PHQ-9 Total Score 7 2 2   Q9: Thoughts of better off dead/self-harm past 2 weeks Not at all Not at all Not at all   has been seeing a naturapth         How many servings of fruits and vegetables do you eat daily?  2-3    On average, how many sweetened beverages do you drink each day (Examples: soda, juice, sweet tea, etc.  Do NOT count diet or artificially sweetened beverages)?   0    How many days per week do you exercise enough to make your heart beat faster? 3 or less    How many minutes a day do you exercise enough to make your heart beat faster? 60 or more  How many days per week do you miss taking your medication? Some days    What makes it hard for you to take your medications?  remembering to take    Answers for HPI/ROS submitted by the patient on 4/15/2021   If you checked off any problems, how difficult have these problems made it for you to do your work, take care of things at home, or get along with other people?: Not difficult at all  PHQ9 TOTAL SCORE: 2  AMANDA 7 TOTAL SCORE: 4    Review of Systems         Objective    /69   Pulse 66   Temp 99.8  F (37.7  C) (Skin)   Resp 16   Ht 1.715 m (5' 7.5\")   Wt 73.9 kg (163 lb)   LMP 04/05/2021   SpO2 100%   BMI 25.15 kg/m    Body mass index is 25.15 kg/m .  Physical Exam   GENERAL: healthy, alert and no distress  NECK: no adenopathy, no asymmetry, masses, or scars and " thyroid normal to palpation  RESP: lungs clear to auscultation - no rales, rhonchi or wheezes  CV: regular rate and rhythm, normal S1 S2, no S3 or S4, no murmur, click or rub, no peripheral edema and peripheral pulses strong  ABDOMEN: soft, nontender, no hepatosplenomegaly, no masses and bowel sounds normal  MS: no gross musculoskeletal defects noted, no edema  PSYCH: mentation appears normal, affect normal/bright    Results for orders placed or performed in visit on 04/15/21 (from the past 24 hour(s))   UA reflex to Microscopic and Culture    Specimen: Midstream Urine   Result Value Ref Range    Color Urine Yellow     Appearance Urine Clear     Glucose Urine Negative NEG^Negative mg/dL    Bilirubin Urine Small (A) NEG^Negative    Ketones Urine 15 (A) NEG^Negative mg/dL    Specific Gravity Urine 1.025 1.003 - 1.035    Blood Urine Negative NEG^Negative    pH Urine 5.5 5.0 - 7.0 pH    Protein Albumin Urine Negative NEG^Negative mg/dL    Urobilinogen Urine 0.2 0.2 - 1.0 EU/dL    Nitrite Urine Negative NEG^Negative    Leukocyte Esterase Urine Negative NEG^Negative    Source Midstream Urine    Wet prep    Specimen: Vagina   Result Value Ref Range    Specimen Description Vagina     Wet Prep No Trichomonas seen     Wet Prep Rare  Clue cells seen   (A)     Wet Prep No yeast seen     Wet Prep No WBC's seen    ESR: Erythrocyte sedimentation rate   Result Value Ref Range    Sed Rate 15 0 - 20 mm/h   **CBC with platelets differential FUTURE 2mo   Result Value Ref Range    WBC 3.4 (L) 4.0 - 11.0 10e9/L    RBC Count 4.32 3.8 - 5.2 10e12/L    Hemoglobin 12.6 11.7 - 15.7 g/dL    Hematocrit 39.5 35.0 - 47.0 %    MCV 91 78 - 100 fl    MCH 29.2 26.5 - 33.0 pg    MCHC 31.9 31.5 - 36.5 g/dL    RDW 14.3 10.0 - 15.0 %    Platelet Count 187 150 - 450 10e9/L    % Neutrophils 66.0 %    % Lymphocytes 18.3 %    % Monocytes 13.0 %    % Eosinophils 1.2 %    % Basophils 1.5 %    Absolute Neutrophil 2.2 1.6 - 8.3 10e9/L    Absolute Lymphocytes 0.6  (L) 0.8 - 5.3 10e9/L    Absolute Monocytes 0.4 0.0 - 1.3 10e9/L    Absolute Eosinophils 0.0 0.0 - 0.7 10e9/L    Absolute Basophils 0.1 0.0 - 0.2 10e9/L    Diff Method Automated Method

## 2021-04-15 ENCOUNTER — OFFICE VISIT (OUTPATIENT)
Dept: FAMILY MEDICINE | Facility: CLINIC | Age: 46
End: 2021-04-15
Payer: COMMERCIAL

## 2021-04-15 VITALS
BODY MASS INDEX: 24.71 KG/M2 | TEMPERATURE: 99.8 F | HEIGHT: 68 IN | HEART RATE: 66 BPM | DIASTOLIC BLOOD PRESSURE: 69 MMHG | SYSTOLIC BLOOD PRESSURE: 105 MMHG | WEIGHT: 163 LBS | OXYGEN SATURATION: 100 % | RESPIRATION RATE: 16 BRPM

## 2021-04-15 DIAGNOSIS — N89.8 VAGINAL DISCHARGE: ICD-10-CM

## 2021-04-15 DIAGNOSIS — E27.9 ADRENAL NODULE (H): ICD-10-CM

## 2021-04-15 DIAGNOSIS — F41.9 ANXIETY: ICD-10-CM

## 2021-04-15 DIAGNOSIS — N20.0 RENAL CALCULUS, RIGHT: ICD-10-CM

## 2021-04-15 DIAGNOSIS — R10.30 LOWER ABDOMINAL PAIN: ICD-10-CM

## 2021-04-15 DIAGNOSIS — Z00.00 LABORATORY EXAMINATION ORDERED AS PART OF A ROUTINE GENERAL MEDICAL EXAMINATION: ICD-10-CM

## 2021-04-15 DIAGNOSIS — R30.0 DYSURIA: Primary | ICD-10-CM

## 2021-04-15 DIAGNOSIS — K21.00 GASTROESOPHAGEAL REFLUX DISEASE WITH ESOPHAGITIS WITHOUT HEMORRHAGE: ICD-10-CM

## 2021-04-15 LAB
ALBUMIN SERPL-MCNC: 3.3 G/DL (ref 3.4–5)
ALBUMIN UR-MCNC: NEGATIVE MG/DL
ALP SERPL-CCNC: 64 U/L (ref 40–150)
ALT SERPL W P-5'-P-CCNC: 15 U/L (ref 0–50)
ANION GAP SERPL CALCULATED.3IONS-SCNC: 8 MMOL/L (ref 3–14)
APPEARANCE UR: CLEAR
AST SERPL W P-5'-P-CCNC: 12 U/L (ref 0–45)
BASOPHILS # BLD AUTO: 0.1 10E9/L (ref 0–0.2)
BASOPHILS NFR BLD AUTO: 1.5 %
BILIRUB SERPL-MCNC: 0.4 MG/DL (ref 0.2–1.3)
BILIRUB UR QL STRIP: ABNORMAL
BUN SERPL-MCNC: 6 MG/DL (ref 7–30)
CALCIUM SERPL-MCNC: 8.8 MG/DL (ref 8.5–10.1)
CHLORIDE SERPL-SCNC: 105 MMOL/L (ref 94–109)
CHOLEST SERPL-MCNC: 154 MG/DL
CO2 SERPL-SCNC: 25 MMOL/L (ref 20–32)
COLOR UR AUTO: YELLOW
CREAT SERPL-MCNC: 0.56 MG/DL (ref 0.52–1.04)
CRP SERPL-MCNC: 20 MG/L (ref 0–8)
DIFFERENTIAL METHOD BLD: ABNORMAL
EOSINOPHIL # BLD AUTO: 0 10E9/L (ref 0–0.7)
EOSINOPHIL NFR BLD AUTO: 1.2 %
ERYTHROCYTE [DISTWIDTH] IN BLOOD BY AUTOMATED COUNT: 14.3 % (ref 10–15)
ERYTHROCYTE [SEDIMENTATION RATE] IN BLOOD BY WESTERGREN METHOD: 15 MM/H (ref 0–20)
GFR SERPL CREATININE-BSD FRML MDRD: >90 ML/MIN/{1.73_M2}
GGT SERPL-CCNC: 10 U/L (ref 0–40)
GLUCOSE SERPL-MCNC: 86 MG/DL (ref 70–99)
GLUCOSE UR STRIP-MCNC: NEGATIVE MG/DL
HCT VFR BLD AUTO: 39.5 % (ref 35–47)
HDLC SERPL-MCNC: 52 MG/DL
HGB BLD-MCNC: 12.6 G/DL (ref 11.7–15.7)
HGB UR QL STRIP: NEGATIVE
INSULIN SERPL-ACNC: 2.9 MU/L (ref 3–25)
IRON SATN MFR SERPL: 11 % (ref 15–46)
IRON SERPL-MCNC: 31 UG/DL (ref 35–180)
KETONES UR STRIP-MCNC: 15 MG/DL
LDH SERPL L TO P-CCNC: 137 U/L (ref 81–234)
LDLC SERPL CALC-MCNC: 92 MG/DL
LEUKOCYTE ESTERASE UR QL STRIP: NEGATIVE
LYMPHOCYTES # BLD AUTO: 0.6 10E9/L (ref 0.8–5.3)
LYMPHOCYTES NFR BLD AUTO: 18.3 %
MCH RBC QN AUTO: 29.2 PG (ref 26.5–33)
MCHC RBC AUTO-ENTMCNC: 31.9 G/DL (ref 31.5–36.5)
MCV RBC AUTO: 91 FL (ref 78–100)
MONOCYTES # BLD AUTO: 0.4 10E9/L (ref 0–1.3)
MONOCYTES NFR BLD AUTO: 13 %
NEUTROPHILS # BLD AUTO: 2.2 10E9/L (ref 1.6–8.3)
NEUTROPHILS NFR BLD AUTO: 66 %
NITRATE UR QL: NEGATIVE
NONHDLC SERPL-MCNC: 102 MG/DL
PH UR STRIP: 5.5 PH (ref 5–7)
PLATELET # BLD AUTO: 187 10E9/L (ref 150–450)
POTASSIUM SERPL-SCNC: 3.5 MMOL/L (ref 3.4–5.3)
PROT SERPL-MCNC: 7.3 G/DL (ref 6.8–8.8)
RBC # BLD AUTO: 4.32 10E12/L (ref 3.8–5.2)
SODIUM SERPL-SCNC: 138 MMOL/L (ref 133–144)
SOURCE: ABNORMAL
SP GR UR STRIP: 1.02 (ref 1–1.03)
SPECIMEN SOURCE: ABNORMAL
T3FREE SERPL-MCNC: 1.7 PG/ML (ref 2.3–4.2)
T4 FREE SERPL-MCNC: 1 NG/DL (ref 0.76–1.46)
TIBC SERPL-MCNC: 278 UG/DL (ref 240–430)
TRIGL SERPL-MCNC: 48 MG/DL
TSH SERPL DL<=0.005 MIU/L-ACNC: 1.89 MU/L (ref 0.4–4)
UROBILINOGEN UR STRIP-ACNC: 0.2 EU/DL (ref 0.2–1)
WBC # BLD AUTO: 3.4 10E9/L (ref 4–11)
WET PREP SPEC: ABNORMAL

## 2021-04-15 PROCEDURE — 85652 RBC SED RATE AUTOMATED: CPT | Performed by: CHIROPRACTOR

## 2021-04-15 PROCEDURE — 86376 MICROSOMAL ANTIBODY EACH: CPT | Performed by: CHIROPRACTOR

## 2021-04-15 PROCEDURE — 86140 C-REACTIVE PROTEIN: CPT | Performed by: CHIROPRACTOR

## 2021-04-15 PROCEDURE — 82785 ASSAY OF IGE: CPT | Performed by: CHIROPRACTOR

## 2021-04-15 PROCEDURE — 82977 ASSAY OF GGT: CPT | Performed by: CHIROPRACTOR

## 2021-04-15 PROCEDURE — 81003 URINALYSIS AUTO W/O SCOPE: CPT | Performed by: FAMILY MEDICINE

## 2021-04-15 PROCEDURE — 80050 GENERAL HEALTH PANEL: CPT | Performed by: CHIROPRACTOR

## 2021-04-15 PROCEDURE — 36415 COLL VENOUS BLD VENIPUNCTURE: CPT | Performed by: CHIROPRACTOR

## 2021-04-15 PROCEDURE — 99000 SPECIMEN HANDLING OFFICE-LAB: CPT | Performed by: CHIROPRACTOR

## 2021-04-15 PROCEDURE — 83615 LACTATE (LD) (LDH) ENZYME: CPT | Performed by: CHIROPRACTOR

## 2021-04-15 PROCEDURE — 87210 SMEAR WET MOUNT SALINE/INK: CPT | Performed by: FAMILY MEDICINE

## 2021-04-15 PROCEDURE — 86800 THYROGLOBULIN ANTIBODY: CPT | Performed by: CHIROPRACTOR

## 2021-04-15 PROCEDURE — 84481 FREE ASSAY (FT-3): CPT | Performed by: CHIROPRACTOR

## 2021-04-15 PROCEDURE — 99214 OFFICE O/P EST MOD 30 MIN: CPT | Performed by: FAMILY MEDICINE

## 2021-04-15 PROCEDURE — 83540 ASSAY OF IRON: CPT | Performed by: CHIROPRACTOR

## 2021-04-15 PROCEDURE — 84439 ASSAY OF FREE THYROXINE: CPT | Performed by: CHIROPRACTOR

## 2021-04-15 PROCEDURE — 83525 ASSAY OF INSULIN: CPT | Performed by: CHIROPRACTOR

## 2021-04-15 PROCEDURE — 83550 IRON BINDING TEST: CPT | Performed by: CHIROPRACTOR

## 2021-04-15 PROCEDURE — 82784 ASSAY IGA/IGD/IGG/IGM EACH: CPT | Performed by: CHIROPRACTOR

## 2021-04-15 PROCEDURE — 80061 LIPID PANEL: CPT | Performed by: CHIROPRACTOR

## 2021-04-15 PROCEDURE — 84479 ASSAY OF THYROID (T3 OR T4): CPT | Mod: 90 | Performed by: CHIROPRACTOR

## 2021-04-15 PROCEDURE — 83090 ASSAY OF HOMOCYSTEINE: CPT | Performed by: CHIROPRACTOR

## 2021-04-15 PROCEDURE — 82306 VITAMIN D 25 HYDROXY: CPT | Performed by: CHIROPRACTOR

## 2021-04-15 RX ORDER — BUSPIRONE HYDROCHLORIDE 10 MG/1
10 TABLET ORAL DAILY
Qty: 90 TABLET | Refills: 1 | Status: SHIPPED | OUTPATIENT
Start: 2021-04-15 | End: 2021-06-15

## 2021-04-15 RX ORDER — METRONIDAZOLE 500 MG/1
500 TABLET ORAL 2 TIMES DAILY
Qty: 14 TABLET | Refills: 0 | Status: SHIPPED | OUTPATIENT
Start: 2021-04-15 | End: 2021-04-22

## 2021-04-15 ASSESSMENT — ANXIETY QUESTIONNAIRES
2. NOT BEING ABLE TO STOP OR CONTROL WORRYING: SEVERAL DAYS
7. FEELING AFRAID AS IF SOMETHING AWFUL MIGHT HAPPEN: SEVERAL DAYS
4. TROUBLE RELAXING: SEVERAL DAYS
GAD7 TOTAL SCORE: 4
5. BEING SO RESTLESS THAT IT IS HARD TO SIT STILL: NOT AT ALL
3. WORRYING TOO MUCH ABOUT DIFFERENT THINGS: NOT AT ALL
GAD7 TOTAL SCORE: 4
1. FEELING NERVOUS, ANXIOUS, OR ON EDGE: SEVERAL DAYS
6. BECOMING EASILY ANNOYED OR IRRITABLE: NOT AT ALL
GAD7 TOTAL SCORE: 4
7. FEELING AFRAID AS IF SOMETHING AWFUL MIGHT HAPPEN: SEVERAL DAYS

## 2021-04-15 ASSESSMENT — PATIENT HEALTH QUESTIONNAIRE - PHQ9
SUM OF ALL RESPONSES TO PHQ QUESTIONS 1-9: 2
SUM OF ALL RESPONSES TO PHQ QUESTIONS 1-9: 2
10. IF YOU CHECKED OFF ANY PROBLEMS, HOW DIFFICULT HAVE THESE PROBLEMS MADE IT FOR YOU TO DO YOUR WORK, TAKE CARE OF THINGS AT HOME, OR GET ALONG WITH OTHER PEOPLE: NOT DIFFICULT AT ALL

## 2021-04-15 ASSESSMENT — MIFFLIN-ST. JEOR: SCORE: 1434.92

## 2021-04-15 NOTE — PATIENT INSTRUCTIONS
Adrenal nodule (H)  CT scan- It did show left adrenal indeterminant 1.5 cm nodule.   That should further be evaluated by CT or MRI.  Renal calculus, right  CT scan - It also showed 2 mm nonobstructing right renal calculus.  Urine analysis is clear.  She does not have right flank pain and has no CVA tenderness.  She is advised to improve hydration. Strain urine and follow up as needed

## 2021-04-15 NOTE — RESULT ENCOUNTER NOTE
Wet prep is positive for bacterial vaginosis which is not a sexually transmitted infection.  Can easily be treated with oral or vaginal antibiotic.  For your convenience I have sent metronidazole twice a day for 1 week you can take it orally.  It will help with vaginal discharge.  It will not help with gastro intestinal symptoms.  Keep me posted with your questions concerns Best regards .

## 2021-04-16 DIAGNOSIS — E27.9 ADRENAL NODULE (H): Primary | ICD-10-CM

## 2021-04-16 LAB
DEPRECATED CALCIDIOL+CALCIFEROL SERPL-MC: 81 UG/L (ref 20–75)
HCYS SERPL-SCNC: 8.5 UMOL/L (ref 4–12)
IGA SERPL-MCNC: 212 MG/DL (ref 84–499)
IGE SERPL-ACNC: 4 KIU/L (ref 0–114)
IGG SERPL-MCNC: 1387 MG/DL (ref 610–1616)
IGM SERPL-MCNC: 186 MG/DL (ref 35–242)
THYROGLOB AB SERPL IA-ACNC: <20 IU/ML (ref 0–40)
THYROPEROXIDASE AB SERPL-ACNC: <10 IU/ML

## 2021-04-16 ASSESSMENT — ANXIETY QUESTIONNAIRES: GAD7 TOTAL SCORE: 4

## 2021-04-16 ASSESSMENT — PATIENT HEALTH QUESTIONNAIRE - PHQ9: SUM OF ALL RESPONSES TO PHQ QUESTIONS 1-9: 2

## 2021-04-16 NOTE — RESULT ENCOUNTER NOTE
Anabel,     The CT scan was unremarkable for a cause of abdominal pain or weight loss, however there was an adrenal gland nodule which radiology wanted  further imaging studies on.   These are usually benign and always incidentally found.   I will order  Adrenal protocol MRI to follow up on this.      Anil Muse MD  Associate Professor of Medicine  Division of Gastroenterology, Hepatology, and Nutrition  [facility]    Katelynn can we set this up.

## 2021-04-17 LAB — T3RU NFR SERPL: 32 % (ref 28–41)

## 2021-04-19 DIAGNOSIS — Z11.59 ENCOUNTER FOR SCREENING FOR OTHER VIRAL DISEASES: ICD-10-CM

## 2021-04-27 DIAGNOSIS — Z11.59 ENCOUNTER FOR SCREENING FOR OTHER VIRAL DISEASES: ICD-10-CM

## 2021-04-27 LAB
SARS-COV-2 RNA RESP QL NAA+PROBE: NORMAL
SPECIMEN SOURCE: NORMAL

## 2021-04-27 PROCEDURE — U0005 INFEC AGEN DETEC AMPLI PROBE: HCPCS | Performed by: SPECIALIST

## 2021-04-27 PROCEDURE — U0003 INFECTIOUS AGENT DETECTION BY NUCLEIC ACID (DNA OR RNA); SEVERE ACUTE RESPIRATORY SYNDROME CORONAVIRUS 2 (SARS-COV-2) (CORONAVIRUS DISEASE [COVID-19]), AMPLIFIED PROBE TECHNIQUE, MAKING USE OF HIGH THROUGHPUT TECHNOLOGIES AS DESCRIBED BY CMS-2020-01-R: HCPCS | Performed by: SPECIALIST

## 2021-04-29 ENCOUNTER — HOSPITAL ENCOUNTER (OUTPATIENT)
Facility: CLINIC | Age: 46
Discharge: HOME | End: 2021-04-29
Attending: SPECIALIST | Admitting: SPECIALIST
Payer: COMMERCIAL

## 2021-04-29 VITALS
RESPIRATION RATE: 13 BRPM | DIASTOLIC BLOOD PRESSURE: 58 MMHG | HEART RATE: 51 BPM | OXYGEN SATURATION: 99 % | SYSTOLIC BLOOD PRESSURE: 100 MMHG

## 2021-04-29 LAB — UPPER GI ENDOSCOPY: NORMAL

## 2021-04-29 PROCEDURE — 88342 IMHCHEM/IMCYTCHM 1ST ANTB: CPT | Mod: 26 | Performed by: PATHOLOGY

## 2021-04-29 PROCEDURE — 43239 EGD BIOPSY SINGLE/MULTIPLE: CPT | Performed by: SPECIALIST

## 2021-04-29 PROCEDURE — 250N000011 HC RX IP 250 OP 636: Performed by: SPECIALIST

## 2021-04-29 PROCEDURE — 88341 IMHCHEM/IMCYTCHM EA ADD ANTB: CPT | Mod: TC | Performed by: SPECIALIST

## 2021-04-29 PROCEDURE — G0500 MOD SEDAT ENDO SERVICE >5YRS: HCPCS | Performed by: SPECIALIST

## 2021-04-29 PROCEDURE — 88305 TISSUE EXAM BY PATHOLOGIST: CPT | Mod: TC | Performed by: SPECIALIST

## 2021-04-29 PROCEDURE — 88305 TISSUE EXAM BY PATHOLOGIST: CPT | Mod: 26 | Performed by: PATHOLOGY

## 2021-04-29 PROCEDURE — 88342 IMHCHEM/IMCYTCHM 1ST ANTB: CPT | Mod: TC,91 | Performed by: SPECIALIST

## 2021-04-29 RX ORDER — LIDOCAINE 40 MG/G
CREAM TOPICAL
Status: DISCONTINUED | OUTPATIENT
Start: 2021-04-29 | End: 2021-04-29 | Stop reason: HOSPADM

## 2021-04-29 RX ORDER — FENTANYL CITRATE 50 UG/ML
INJECTION, SOLUTION INTRAMUSCULAR; INTRAVENOUS PRN
Status: COMPLETED | OUTPATIENT
Start: 2021-04-29 | End: 2021-04-29

## 2021-04-29 RX ORDER — ONDANSETRON 2 MG/ML
4 INJECTION INTRAMUSCULAR; INTRAVENOUS
Status: DISCONTINUED | OUTPATIENT
Start: 2021-04-29 | End: 2021-04-29 | Stop reason: HOSPADM

## 2021-04-29 RX ADMIN — MIDAZOLAM 2 MG: 1 INJECTION INTRAMUSCULAR; INTRAVENOUS at 09:26

## 2021-04-29 RX ADMIN — MIDAZOLAM 1 MG: 1 INJECTION INTRAMUSCULAR; INTRAVENOUS at 09:30

## 2021-04-29 RX ADMIN — FENTANYL CITRATE 100 MCG: 50 INJECTION, SOLUTION INTRAMUSCULAR; INTRAVENOUS at 09:25

## 2021-04-29 RX ADMIN — MIDAZOLAM 1 MG: 1 INJECTION INTRAMUSCULAR; INTRAVENOUS at 09:35

## 2021-04-29 NOTE — H&P
Pre-Endoscopy History and Physical     Anabel Moon MRN# [id number]   YOB: 1975 Age: 45 year old     Date of Procedure: 4/29/2021  Primary care provider: Angelica Mendez  Type of Endoscopy: Gastroscopy with possible biopsy, possible dilation  Reason for Procedure: abdominal pain and weight loss; no diarrhea, poor appetite. Post prandial pain exacerbation.   Type of Anesthesia Anticipated: Conscious Sedation    HPI:    Anabel is a 45 year old female who will be undergoing the above procedure.      A history and physical has been performed. The patient's medications and allergies have been reviewed. The risks and benefits of the procedure and the sedation options and risks were discussed with the patient.  All questions were answered and informed consent was obtained.      She denies a personal or family history of anesthesia complications or bleeding disorders.     Patient Active Problem List   Diagnosis     AMANDA (generalized anxiety disorder)     Moderate major depression (H)     Heart burn     Anxiety attack     Gastroesophageal reflux disease with esophagitis without hemorrhage     Allergic rhinitis        Past Medical History:   Diagnosis Date     Current moderate episode of major depressive disorder without prior episode (H) 1/11/2021     Thyroid disease hypothyroidism        History reviewed. No pertinent surgical history.    Social History     Tobacco Use     Smoking status: Never Smoker     Smokeless tobacco: Never Used   Substance Use Topics     Alcohol use: Never     Frequency: Never     Binge frequency: Never       Family History   Problem Relation Age of Onset     Anxiety Disorder Sister      Mental Illness Brother        Prior to Admission medications    Medication Sig Start Date End Date Taking? Authorizing Provider   busPIRone (BUSPAR) 10 MG tablet Take 1 tablet (10 mg) by mouth daily 4/15/21  Yes Angelica Mendez MD       Allergies   Allergen Reactions     No Known Allergies      "    REVIEW OF SYSTEMS:   5 point ROS negative except as noted above in HPI, including Gen., Resp., CV, GI &  system review.    PHYSICAL EXAM:   /59   Pulse 54   Resp 16   LMP 04/05/2021   SpO2 100%  Estimated body mass index is 25.15 kg/m  as calculated from the following:    Height as of 4/15/21: 1.715 m (5' 7.5\").    Weight as of 4/15/21: 73.9 kg (163 lb).   GENERAL APPEARANCE: alert, and oriented  MENTAL STATUS: alert  AIRWAY EXAM: Mallampatti Class I (visualization of the soft palate, fauces, uvula, anterior and posterior pillars)  RESP: lungs clear to auscultation - no rales, rhonchi or wheezes  CV: regular rates and rhythm  DIAGNOSTICS:    Not indicated    IMPRESSION   ASA Class 2 - Mild systemic disease    PLAN:   Plan for Gastroscopy with possible biopsy, possible dilation. We discussed the risks, benefits and alternatives and the patient wished to proceed.    The above has been forwarded to the consulting provider.      Signed Electronically by: Leon Pascual MD  April 29, 2021          "

## 2021-04-30 ENCOUNTER — MYC MEDICAL ADVICE (OUTPATIENT)
Dept: FAMILY MEDICINE | Facility: CLINIC | Age: 46
End: 2021-04-30

## 2021-05-02 ENCOUNTER — ANCILLARY PROCEDURE (OUTPATIENT)
Dept: MRI IMAGING | Facility: CLINIC | Age: 46
End: 2021-05-02
Attending: INTERNAL MEDICINE
Payer: COMMERCIAL

## 2021-05-02 DIAGNOSIS — E27.9 ADRENAL NODULE (H): ICD-10-CM

## 2021-05-02 PROCEDURE — A9585 GADOBUTROL INJECTION: HCPCS | Performed by: RADIOLOGY

## 2021-05-02 PROCEDURE — 74183 MRI ABD W/O CNTR FLWD CNTR: CPT | Performed by: RADIOLOGY

## 2021-05-02 RX ORDER — GADOBUTROL 604.72 MG/ML
7.5 INJECTION INTRAVENOUS ONCE
Status: COMPLETED | OUTPATIENT
Start: 2021-05-02 | End: 2021-05-02

## 2021-05-02 RX ADMIN — GADOBUTROL 7.5 ML: 604.72 INJECTION INTRAVENOUS at 08:30

## 2021-05-02 NOTE — DISCHARGE INSTRUCTIONS
MRI Contrast Discharge Instructions    The IV contrast you received today will pass out of your body in your  urine. This will happen in the next 24 hours. You will not feel this process.  Your urine will not change color.    Drink at least 4 extra glasses of water or juice today (unless your doctor  has restricted your fluids). This reduces the stress on your kidneys.  You may take your regular medicines.    If you are on dialysis: It is best to have dialysis today.    If you have a reaction: Most reactions happen right away. If you have  any new symptoms after leaving the hospital (such as hives or swelling),  call your hospital at the correct number below. Or call your family doctor.  If you have breathing distress or wheezing, call 911.    Special instructions: ***    I have read and understand the above information.    Signature:______________________________________ Date:___________    Staff:__________________________________________ Date:___________     Time:__________    [facility] Departments:    ___[place]: [phone]  ___[facility]: [phone]  ___[place]: [phone] ___[facility]: [phone]  ___[facility]: [phone]  ___[facility]: [phone]  ___[phone]  ___[facility]: [phone]  ___[phone]

## 2021-05-03 LAB — COPATH REPORT: NORMAL

## 2021-05-04 ENCOUNTER — TRANSFERRED RECORDS (OUTPATIENT)
Dept: HEALTH INFORMATION MANAGEMENT | Facility: CLINIC | Age: 46
End: 2021-05-04

## 2021-05-04 NOTE — RESULT ENCOUNTER NOTE
Assessment: As you know, Helicobacter pylori is a bacterium that infects the stomach lining, causing inflammation. Helicobacter pylori gastritis has been strongly associated with peptic ulcer disease (typically the diffuse antral predominant subtype) and also with stomach cancer (typically the diffuse pangastritis subtype) [1]. There is also an association with iron deficiency anemia. Clinical data suggest that eradicating this organism improves dyspepsia [2], clearly reduces ulcer recurrence and may reduce the development of gastric cancer. Consequently, I recommend treatment [3].  Since eradication rates are only about 80%, documentation of eradication following the course of treatment is recommended.     There is no evidence of celiac disease, or other explanation for weight loss.     Recommendations:*   Omeprazole 20 mg PO twice daily for 10 days  Amoxicillin 500 mg two tabs PO twice daily for 10 days  Clarithromycin 500 mg PO twice daily for 10 days     I recommend verifying eradication of H pylori about one month after completing treatment, by stool antigen or breath Hydrogen testing.

## 2021-05-05 NOTE — RESULT ENCOUNTER NOTE
Dear Anabel Moon     I hope you are able to complete the prescribed antibiotics by Dr Pascual. In one month- follow up in clinic, we can , also test the stool for eradication of the Helicobacter Pylori  Infection.  Hope that helps.    Please keep us posted with questions or concerns .      Best Regards,    Angelica Mendez MD  [facility]  [phone]

## 2021-05-05 NOTE — RESULT ENCOUNTER NOTE
I contacted Anabel regarding her results. I learned she has had H. Pylori in the past and got treated for it. She seemed to recall getting a PPI, amoxicillin and clarithromycin, so we should not repeat that.  I mentioned quadruple therapy (omeprazole, pepto bismol, metronidazole and tetracycline for 14 days), but she expressed concerns over the antibiotics and the duration of therapy and asked if there was something else she could take.  Levofloxacin based therapy has been reasonably effective as a salvage therapy; it is taken for ten days. She felt more comfortable with that option.  I will mail her the following prescriptions: o Omeprazole 20 mg PO BID for 10 days o Amoxicillin 500 mg PO twice daily for 10 days. o Levofloxacin 500 mg PO twice daily for 10 days  (I tried to enter [facility] on [facility], but [place] did not recognize it).

## 2021-05-09 ENCOUNTER — MYC MEDICAL ADVICE (OUTPATIENT)
Dept: FAMILY MEDICINE | Facility: CLINIC | Age: 46
End: 2021-05-09

## 2021-05-10 ENCOUNTER — VIRTUAL VISIT (OUTPATIENT)
Dept: FAMILY MEDICINE | Facility: CLINIC | Age: 46
End: 2021-05-10
Payer: COMMERCIAL

## 2021-05-10 DIAGNOSIS — F41.9 ANXIETY: ICD-10-CM

## 2021-05-10 DIAGNOSIS — B96.81 HELICOBACTER PYLORI GASTRITIS: Primary | ICD-10-CM

## 2021-05-10 DIAGNOSIS — K29.70 HELICOBACTER PYLORI GASTRITIS: Primary | ICD-10-CM

## 2021-05-10 PROCEDURE — 99215 OFFICE O/P EST HI 40 MIN: CPT | Mod: GT | Performed by: FAMILY MEDICINE

## 2021-05-10 RX ORDER — METRONIDAZOLE 500 MG/1
500 TABLET ORAL 3 TIMES DAILY
Qty: 42 TABLET | Refills: 0 | Status: SHIPPED | OUTPATIENT
Start: 2021-05-10 | End: 2021-05-11

## 2021-05-10 RX ORDER — CITALOPRAM HYDROBROMIDE 10 MG/1
10 TABLET ORAL DAILY
Qty: 30 TABLET | Refills: 1 | Status: SHIPPED | OUTPATIENT
Start: 2021-05-10 | End: 2021-06-15

## 2021-05-10 RX ORDER — METRONIDAZOLE 500 MG/1
500 TABLET ORAL 2 TIMES DAILY
Qty: 20 TABLET | Refills: 0 | Status: SHIPPED | OUTPATIENT
Start: 2021-05-10 | End: 2021-05-10

## 2021-05-10 RX ORDER — AMOXICILLIN 500 MG/1
1000 CAPSULE ORAL 2 TIMES DAILY
Qty: 56 CAPSULE | Refills: 0 | Status: SHIPPED | OUTPATIENT
Start: 2021-05-10 | End: 2021-05-24

## 2021-05-10 RX ORDER — AMOXICILLIN 500 MG/1
500 CAPSULE ORAL 2 TIMES DAILY
Qty: 20 CAPSULE | Refills: 0 | Status: SHIPPED | OUTPATIENT
Start: 2021-05-10 | End: 2021-05-10

## 2021-05-10 RX ORDER — CLARITHROMYCIN 500 MG
500 TABLET ORAL 2 TIMES DAILY
Qty: 20 TABLET | Refills: 0 | Status: SHIPPED | OUTPATIENT
Start: 2021-05-10 | End: 2021-05-11

## 2021-05-10 NOTE — PROGRESS NOTES
kailyn is a 45 year old who is being evaluated via a billable video visit.      How would you like to obtain your AVS? MyChart  If the video visit is dropped, the invitation should be resent by: Text to cell phone: [phone]  Will anyone else be joining your video visit? No      Video Start Time: 12:45 PM    Assessment & Plan     Helicobacter pylori gastritis-    She is advised to keep taking proton pump inhibitors   & Triple antibiotic are sent to her pharmacy   She previously did take amox and clarithromycin     the new triple therapy is Levaquin based  - amoxicillin (AMOXIL) 500 MG capsule; Take 2 capsule (500 mg) by mouth 2 times daily for 14 days  -levaquin 500 mg once daily for 14 days   - omeprazole (PRILOSEC) 20 MG DR capsule; Take 1 capsule (20 mg) by mouth 2 times daily    - Helicobacter pylori Antigen Stool; Future    Advised to continue proton pump inhibitors longer   Recommend verifying eradication of H pylori about one month after completing treatment, by stool antigen  & its future, she will need lab only appointment    Anxiety  Discussed SNRI- because previously tried selective serotonin reuptake inhibitor- have not been effective like sertraline or in case of celexa- she had reported in past , it was affecting her sleep but now reports she never tried it for more than a few days, and would like to get it give it appropriate duration   - citalopram (CELEXA) 10 MG tablet; Take 1 tablet (10 mg) by mouth daily  Potential medication side effects were discussed with the patient; let me know if any occur.      40 minutes spent on the date of the encounter doing chart review, history and exam, documentation and further activities per the note           Return in about 2 weeks (around 5/24/2021) for virtual/video visit, concerns,unresolved, mood, medications recheck/review/refill.    Angelica Mendez MD  [facility]    Subjective   kailyn is a 45 year old who presents for the  following health issues   HPI     Depression and Anxiety Follow-Up    How are you doing with your depression since your last visit? No change    How are you doing with your anxiety since your last visit?  Worsened since got the diagnoses of Helicobacter Pylori  again    Currently in [facility]- does not have antibiotics- for Helicobacter Pylori  eradication    Are you having other symptoms that might be associated with depression or anxiety? No    Have you had a significant life event? OTHER: family      Do you have any concerns with your use of alcohol or other drugs? No    Social History     Tobacco Use     Smoking status: Never Smoker     Smokeless tobacco: Never Used   Substance Use Topics     Alcohol use: Never     Frequency: Never     Binge frequency: Never     Drug use: Never     PHQ 2/23/2021 3/9/2021 4/15/2021   PHQ-9 Total Score 7 2 2   Q9: Thoughts of better off dead/self-harm past 2 weeks Not at all Not at all Not at all     AAMNDA-7 SCORE 2/23/2021 3/9/2021 4/15/2021   Total Score 15 (severe anxiety) - 4 (minimal anxiety)   Total Score 15 3 4         Suicide Assessment Five-step Evaluation and Treatment (SAFE-T)      How many servings of fruits and vegetables do you eat daily?  0-1    On average, how many sweetened beverages do you drink each day (Examples: soda, juice, sweet tea, etc.  Do NOT count diet or artificially sweetened beverages)?   0    How many days per week do you exercise enough to make your heart beat faster? 3 or less    How many minutes a day do you exercise enough to make your heart beat faster? 9 or less    How many days per week do you miss taking your medication? 0        Review of Systems very anxious since the diagnoses of Helicobacter Pylori  And initially it was treated 2/2021        Objective           Vitals:  No vitals were obtained today due to virtual visit.    Physical Exam   GENERAL: Healthy, alert and no distress  EYES: Eyes grossly normal to inspection.  No discharge or  erythema, or obvious scleral/conjunctival abnormalities.  RESP: No audible wheeze, cough, or visible cyanosis.  No visible retractions or increased work of breathing.    SKIN: Visible skin clear. No significant rash, abnormal pigmentation or lesions.  NEURO: Cranial nerves grossly intact.  Mentation and speech appropriate for age.  PSYCH: Mentation appears normal, affect normal/bright, judgement and insight intact, normal speech and appearance well-groomed.                Video-Visit Details    Type of service:  Video Visit    Video End Time:1:00 PM    Originating Location (pt. Location): Home    Distant Location (provider location):  [facility]     Platform used for Video Visit: Timbo     Total time spent orally charting, counseling and reviewing her endoscopy and recent labs is about 41 miins

## 2021-05-10 NOTE — PATIENT INSTRUCTIONS
& Triple antibiotic are sent to her pharmacy   She previously did take amox and clarithromycin  And I have added metronidazole 500 mg  Three times daily  daily     - amoxicillin (AMOXIL) 500 MG capsule; Take 2 capsule (500 mg) by mouth 2 times daily for 14 days  - clarithromycin (BIAXIN) 500 MG tablet; Take 1 tablet (500 mg) by mouth 2 times daily for 14 days  - omeprazole (PRILOSEC) 20 MG DR capsule; Take 1 capsule (20 mg) by mouth 2 times daily  - Helicobacter pylori Antigen Stool; Future

## 2021-05-11 ENCOUNTER — TELEPHONE (OUTPATIENT)
Dept: FAMILY MEDICINE | Facility: CLINIC | Age: 46
End: 2021-05-11

## 2021-05-11 RX ORDER — LEVOFLOXACIN 500 MG/1
500 TABLET, FILM COATED ORAL DAILY
Qty: 14 TABLET | Refills: 0 | Status: SHIPPED | OUTPATIENT
Start: 2021-05-11 | End: 2021-06-15

## 2021-05-11 NOTE — TELEPHONE ENCOUNTER
Routing to provider - Andrea/UP Triage - please review and advise as appropriate    Received call from - [facility] #[id number] - [place], [place] - [phone] - pharmacist Marlene - [phone] - requesting a return call    Clarification request:  Medication orders sent for treatment of Helicobacter pylori gastritis    Office visit 5/10/21 - writer notes multiple plans documented and message advising pharmacy to 'cancel previous orders' in recently prescribed medication orders - please clarify on plan and name medications that need to be discontinued    1. FIRST PLAN    Helicobacter pylori gastritis-     She is advised to keep taking proton pump inhibitors   & Triple antibiotic are sent to her pharmacy   She previously did take amox and clarithromycin      the new triple therapy is Levaquin based  - amoxicillin (AMOXIL) 500 MG capsule; Take 2 capsule (500 mg) by mouth 2 times daily for 14 days  -levaquin 500 mg once daily for 14 days   - omeprazole (PRILOSEC) 20 MG DR capsule; Take 1 capsule (20 mg) by mouth 2 times daily      2. SECOND PLAN    & Triple antibiotic are sent to her pharmacy   She previously did take amox and clarithromycin  And I have added metronidazole 500 mg  Three times daily  daily     - amoxicillin (AMOXIL) 500 MG capsule; Take 2 capsule (500 mg) by mouth 2 times daily for 14 days  - clarithromycin (BIAXIN) 500 MG tablet; Take 1 tablet (500 mg) by mouth 2 times daily for 14 days  - omeprazole (PRILOSEC) 20 MG DR capsule; Take 1 capsule (20 mg) by mouth 2 times daily  - Helicobacter pylori Antigen Stool; Future      Drug Interaction:  Pharmacy needs verbal OK for override on drug interaction - Levaquin, Citalopram, Clarithromycin - causing increase risk of QT Interval side effect

## 2021-05-11 NOTE — TELEPHONE ENCOUNTER
Patient knows  amoxcillin 1 mg twice daily for 14 days  levaquin 500 mg once daily for 14 days  proton pump inhibitors twice daily     Continue with her usual medications celexa for mental health   Cancel all other prescriptions for antibiotics    thanks

## 2021-06-14 NOTE — PROGRESS NOTES
Assessment & Plan     Helicobacter pylori gastritis  She reports symptoms are better  She need refill on omeprazole   - omeprazole (PRILOSEC) 20 MG DR capsule; Take 1 capsule (20 mg) by mouth daily  -she has finished  antibiotic for Helicobacter Pylori  And over all symptoms have improved    Moderate major depression (H)    Anxiety & depression is better- Did not like sertraline,- busPIRone (BUSPAR)   Better on celexa 5 mg QD instead of 10 mg- refill given for 1 yr 06/15/21   New job starting last week , its a good change.  PHQ 3/9/2021 4/15/2021 6/15/2021   PHQ-9 Total Score 2 2 3   Q9: Thoughts of better off dead/self-harm past 2 weeks Not at all Not at all Not at all     - citalopram (CELEXA) 10 MG tablet; Take 0.5 tablets (5 mg) by mouth daily    Anxiety  AMANDA-7 SCORE 3/9/2021 4/15/2021 6/15/2021   Total Score - 4 (minimal anxiety) -   Total Score 3 4 6   - citalopram (CELEXA) 10 MG tablet; Take 0.5 tablets (5 mg) by mouth daily    Screening for HIV (human immunodeficiency virus)  - **HIV Antigen Antibody Combo FUTURE anytime; Future    Need for hepatitis C screening test  - **Hepatitis C Screen Reflex to RNA FUTURE anytime; Future          Return in about 3 months (around 9/15/2021) for routine physical.    Angelica Mendez MD  [facility]   kailyn is a 45 year old who presents for the following health issues     HPI     Patient is requesting H-Pylori   Finished double antibiotic amox and Levaquin  about 2 weeks ago  She reports symptoms are better  She need refill on omeprazole     Anxiety & depression is better- Did not like sertraline,- busPIRone (BUSPAR)   Better on celexa 5 mg QD instead of 10 mg- refill given for 1 yr 06/15/21   New job starting last week , its a good change.    No nausea and vomiting & appetite is back  Advised to continue with omeprazole   She feels her weight is coming back up- as she was down to 149  & feels better at 150's lb  Wt Readings from  "Last 5 Encounters:   06/15/21 70.8 kg (156 lb)   04/15/21 73.9 kg (163 lb)   04/12/21 72.6 kg (160 lb)   02/23/21 76.2 kg (168 lb)   02/17/21 74.8 kg (165 lb)   .       Review of Systems   Constitutional, HEENT, cardiovascular, pulmonary, GI, , musculoskeletal, neuro, skin, endocrine and psych systems are negative, except as otherwise noted.      Objective    /73   Pulse 54   Ht 1.715 m (5' 7.5\")   Wt 70.8 kg (156 lb)   SpO2 95%   BMI 24.07 kg/m    Body mass index is 24.07 kg/m .  Physical Exam   GENERAL: healthy, alert and no distress  NECK: no adenopathy, no asymmetry, masses, or scars and thyroid normal to palpation  RESP: lungs clear to auscultation - no rales, rhonchi or wheezes  CV: regular rate and rhythm, normal S1 S2, no S3 or S4, no murmur, click or rub, no peripheral edema and peripheral pulses strong  ABDOMEN: soft, nontender, no hepatosplenomegaly, no masses and bowel sounds normal  PSYCH: mentation appears normal, affect normal/bright  AMANDA-7 SCORE 3/9/2021 4/15/2021 6/15/2021   Total Score - 4 (minimal anxiety) -   Total Score 3 4 6     PHQ 3/9/2021 4/15/2021 6/15/2021   PHQ-9 Total Score 2 2 3   Q9: Thoughts of better off dead/self-harm past 2 weeks Not at all Not at all Not at all     No results found for this or any previous visit (from the past 24 hour(s)).            "

## 2021-06-15 ENCOUNTER — OFFICE VISIT (OUTPATIENT)
Dept: FAMILY MEDICINE | Facility: CLINIC | Age: 46
End: 2021-06-15
Payer: COMMERCIAL

## 2021-06-15 VITALS
BODY MASS INDEX: 23.64 KG/M2 | OXYGEN SATURATION: 95 % | SYSTOLIC BLOOD PRESSURE: 108 MMHG | DIASTOLIC BLOOD PRESSURE: 73 MMHG | HEART RATE: 54 BPM | HEIGHT: 68 IN | WEIGHT: 156 LBS

## 2021-06-15 DIAGNOSIS — F41.9 ANXIETY: ICD-10-CM

## 2021-06-15 DIAGNOSIS — F32.1 MODERATE MAJOR DEPRESSION (H): Primary | ICD-10-CM

## 2021-06-15 DIAGNOSIS — Z11.59 NEED FOR HEPATITIS C SCREENING TEST: ICD-10-CM

## 2021-06-15 DIAGNOSIS — K29.70 HELICOBACTER PYLORI GASTRITIS: ICD-10-CM

## 2021-06-15 DIAGNOSIS — B96.81 HELICOBACTER PYLORI GASTRITIS: ICD-10-CM

## 2021-06-15 DIAGNOSIS — Z11.4 SCREENING FOR HIV (HUMAN IMMUNODEFICIENCY VIRUS): ICD-10-CM

## 2021-06-15 PROCEDURE — 99214 OFFICE O/P EST MOD 30 MIN: CPT | Performed by: FAMILY MEDICINE

## 2021-06-15 PROCEDURE — 96127 BRIEF EMOTIONAL/BEHAV ASSMT: CPT | Performed by: FAMILY MEDICINE

## 2021-06-15 RX ORDER — CITALOPRAM HYDROBROMIDE 10 MG/1
5 TABLET ORAL DAILY
Qty: 45 TABLET | Refills: 3 | Status: SHIPPED | OUTPATIENT
Start: 2021-06-15

## 2021-06-15 ASSESSMENT — ANXIETY QUESTIONNAIRES
7. FEELING AFRAID AS IF SOMETHING AWFUL MIGHT HAPPEN: NOT AT ALL
3. WORRYING TOO MUCH ABOUT DIFFERENT THINGS: SEVERAL DAYS
GAD7 TOTAL SCORE: 6
6. BECOMING EASILY ANNOYED OR IRRITABLE: SEVERAL DAYS
5. BEING SO RESTLESS THAT IT IS HARD TO SIT STILL: SEVERAL DAYS
2. NOT BEING ABLE TO STOP OR CONTROL WORRYING: SEVERAL DAYS
IF YOU CHECKED OFF ANY PROBLEMS ON THIS QUESTIONNAIRE, HOW DIFFICULT HAVE THESE PROBLEMS MADE IT FOR YOU TO DO YOUR WORK, TAKE CARE OF THINGS AT HOME, OR GET ALONG WITH OTHER PEOPLE: NOT DIFFICULT AT ALL
1. FEELING NERVOUS, ANXIOUS, OR ON EDGE: SEVERAL DAYS

## 2021-06-15 ASSESSMENT — MIFFLIN-ST. JEOR: SCORE: 1403.17

## 2021-06-15 ASSESSMENT — PATIENT HEALTH QUESTIONNAIRE - PHQ9
5. POOR APPETITE OR OVEREATING: SEVERAL DAYS
SUM OF ALL RESPONSES TO PHQ QUESTIONS 1-9: 3

## 2021-06-15 NOTE — ASSESSMENT & PLAN NOTE
Did not like sertraline,- busPIRone (BUSPAR)   Better on celexa 5 mg QD instead of 10 mg- refill given for 1 yr 06/15/21

## 2021-06-16 ASSESSMENT — ANXIETY QUESTIONNAIRES: GAD7 TOTAL SCORE: 6

## 2021-06-18 DIAGNOSIS — K29.70 HELICOBACTER PYLORI GASTRITIS: ICD-10-CM

## 2021-06-18 DIAGNOSIS — B96.81 HELICOBACTER PYLORI GASTRITIS: ICD-10-CM

## 2021-06-18 PROCEDURE — 87338 HPYLORI STOOL AG IA: CPT | Performed by: FAMILY MEDICINE

## 2021-06-21 LAB — H PYLORI AG STL QL IA: NEGATIVE

## 2021-07-24 ENCOUNTER — TELEPHONE (OUTPATIENT)
Dept: GASTROENTEROLOGY | Facility: CLINIC | Age: 46
End: 2021-07-24

## 2021-08-07 ENCOUNTER — TELEPHONE (OUTPATIENT)
Dept: GASTROENTEROLOGY | Facility: CLINIC | Age: 46
End: 2021-08-07

## 2021-08-07 NOTE — TELEPHONE ENCOUNTER
Left message to schedule follow up with Dr Muse per provider last visit on 4/12/21 states to return in 3 months. Give gastro number to call to schedule.

## 2021-10-11 ENCOUNTER — HEALTH MAINTENANCE LETTER (OUTPATIENT)
Age: 46
End: 2021-10-11

## 2021-10-19 PROBLEM — F32.9 MAJOR DEPRESSION: Status: ACTIVE | Noted: 2021-02-01

## 2022-01-30 ENCOUNTER — HEALTH MAINTENANCE LETTER (OUTPATIENT)
Age: 47
End: 2022-01-30

## 2022-09-24 ENCOUNTER — HEALTH MAINTENANCE LETTER (OUTPATIENT)
Age: 47
End: 2022-09-24

## 2023-01-29 ENCOUNTER — HEALTH MAINTENANCE LETTER (OUTPATIENT)
Age: 48
End: 2023-01-29

## 2023-05-08 ENCOUNTER — HEALTH MAINTENANCE LETTER (OUTPATIENT)
Age: 48
End: 2023-05-08

## (undated) RX ORDER — FENTANYL CITRATE 50 UG/ML
INJECTION, SOLUTION INTRAMUSCULAR; INTRAVENOUS
Status: DISPENSED
Start: 2021-04-29